# Patient Record
Sex: FEMALE | Race: WHITE | NOT HISPANIC OR LATINO | Employment: UNEMPLOYED | URBAN - NONMETROPOLITAN AREA
[De-identification: names, ages, dates, MRNs, and addresses within clinical notes are randomized per-mention and may not be internally consistent; named-entity substitution may affect disease eponyms.]

---

## 2017-11-25 ENCOUNTER — HOSPITAL ENCOUNTER (EMERGENCY)
Facility: HOSPITAL | Age: 57
Discharge: HOME | End: 2017-11-25
Attending: NURSE PRACTITIONER | Admitting: NURSE PRACTITIONER

## 2017-11-25 VITALS
HEART RATE: 70 BPM | SYSTOLIC BLOOD PRESSURE: 160 MMHG | RESPIRATION RATE: 18 BRPM | DIASTOLIC BLOOD PRESSURE: 91 MMHG | TEMPERATURE: 97.8 F | OXYGEN SATURATION: 98 %

## 2017-11-25 DIAGNOSIS — M75.52 ACUTE SHOULDER BURSITIS, LEFT: ICD-10-CM

## 2017-11-25 PROCEDURE — 99202 OFFICE O/P NEW SF 15 MIN: CPT | Performed by: NURSE PRACTITIONER

## 2017-11-25 PROCEDURE — 99213 OFFICE O/P EST LOW 20 MIN: CPT

## 2017-11-25 RX ORDER — LISINOPRIL/HYDROCHLOROTHIAZIDE 10-12.5 MG
1 TABLET ORAL DAILY
COMMUNITY
End: 2018-04-15

## 2017-11-25 RX ORDER — PREDNISONE 20 MG/1
TABLET ORAL
Qty: 10 TABLET | Refills: 0 | Status: SHIPPED | OUTPATIENT
Start: 2017-11-25 | End: 2018-04-15

## 2017-11-25 RX ORDER — CYCLOBENZAPRINE HCL 10 MG
5-10 TABLET ORAL 3 TIMES DAILY PRN
Qty: 20 TABLET | Refills: 0 | Status: SHIPPED | OUTPATIENT
Start: 2017-11-25 | End: 2018-04-15

## 2017-11-25 ASSESSMENT — ENCOUNTER SYMPTOMS
ARTHRALGIAS: 1
NECK STIFFNESS: 0
NECK PAIN: 0
CARDIOVASCULAR NEGATIVE: 1
JOINT SWELLING: 0
RESPIRATORY NEGATIVE: 1
GASTROINTESTINAL NEGATIVE: 1
CONSTITUTIONAL NEGATIVE: 1

## 2017-11-25 NOTE — ED AVS SNAPSHOT
[facility]    [address]    [place] [address]    Phone:  [phone]                                       Bibiana Stewart   MRN: [id number]    Department:  [facility]   Date of Visit:  11/25/2017           Patient Information     Date Of Birth          1960        Your diagnoses for this visit were:     Acute shoulder bursitis, left        You were seen by Qing Guerrier NP.      Follow-up Information     Please follow up.    Why:  Follow up with [facility]         Discharge Instructions         Bursitis  You have bursitis. This is an inflammation of the bursa. These are small, fluid-filled sacs that surround the larger joints of the body. The bursa help the muscles and tendons move smoothly over the joints.  Bursitis often happens in the shoulder. But it can also affect the elbows, hips, pelvis, knees, toes, and heels. Bursitis can be caused by injury, overuse of the joint, or infection of the bursa. Symptoms include pain and tenderness over a joint. Symptoms get worse with movement.  Bursitis is treated with an anti-inflammatory medicine and by resting the joint. More severe cases require injection of medicine directly into the bursa.    Home care    Rest the painful joint and protect it from movement. This will allow the inflammation to heal faster.    Apply an ice pack over the injured area for no more than 15 to 20 minutes. Do this every 3 to 6 hours for the first 24 to 48 hours. Keep using ice packs 3 to 4 times a day until the pain and swelling improves.     To make an ice pack, put ice cubes in a sealed plastic zip-lock bag. Wrap the bag in a clean, thin towel or cloth. Never put ice or an ice pack directly on the skin.      You may take over-the-counter pain medicine to treat pain and inflammation    As your symptoms improve, slowly begin to move the joint. Do not overuse the joint. This may cause the symptoms to flare up again.               Review  of your medicines      START taking        Dose / Directions Last dose taken    cyclobenzaprine 10 MG tablet   Commonly known as:  FLEXERIL   Dose:  5-10 mg   Quantity:  20 tablet        Take 0.5-1 tablets (5-10 mg) by mouth 3 times daily as needed for muscle spasms   Refills:  0        predniSONE 20 MG tablet   Commonly known as:  DELTASONE   Quantity:  10 tablet        Take two tablets (= 40mg) each day for 5 (five) days   Refills:  0          Our records show that you are taking the medicines listed below. If these are incorrect, please call your family doctor or clinic.        Dose / Directions Last dose taken    IBUPROFEN PO        Refills:  0        lisinopril-hydrochlorothiazide 10-12.5 MG per tablet   Commonly known as:  PRINZIDE/ZESTORETIC   Dose:  1 tablet        Take 1 tablet by mouth daily   Refills:  0                Prescriptions were sent or printed at these locations (2 Prescriptions)                   [id number]  [place], [place] - [address] [facility] [address]   [address] [address], [place] [address]    Telephone:  [phone]   Fax:  [phone]   Hours:                  E-Prescribed (2 of 2)         predniSONE (DELTASONE) 20 MG tablet               cyclobenzaprine (FLEXERIL) 10 MG tablet                Orders Needing Specimen Collection     None      Pending Results     No orders found from 11/23/2017 to 11/26/2017.            Pending Culture Results     No orders found from 11/23/2017 to 11/26/2017.            Thank you for choosing [facility]       Thank you for choosing [facility] for your care. Our goal is always to provide you with excellent care. Hearing back from our patients is one way we can continue to improve our services. Please take a few minutes to complete the written survey that you may receive in the mail after you visit with us. Thank you!        [facility] Information     [employer] lets you send messages to your doctor, view your test results, renew your prescriptions, schedule appointments and  "more. To sign up, go to [email or web address] . Click on \"Log in\" on the left side of the screen, which will take you to the Welcome page. Then click on \"Sign up Now\" on the right side of the page.     You will be asked to enter the access code listed below, as well as some personal information. Please follow the directions to create your username and password.     Your access code is: [email or web address]  Expires: 2018 11:26 AM     Your access code will  in 90 days. If you need help or a new code, please call your [facility] clinic or [phone].        Care EveryWhere ID     This is your Care EveryWhere ID. This could be used by other organizations to access your [facility] medical records  [id number]        Equal Access to Services     SALOMON BEATTY : Alicia Alaniz, mary moy, juan josé galan, moncho garay. So [facility] [phone].    ATENCIÓN: Si habla español, tiene a zayas disposición servicios gratuitos de asistencia lingüística. Llame al [phone].    We comply with applicable federal civil rights laws and [place] laws. We do not discriminate on the basis of race, color, national origin, age, disability, sex, sexual orientation, or gender identity.            After Visit Summary       This is your record. Keep this with you and show to your community pharmacist(s) and doctor(s) at your next visit.                  "

## 2017-11-25 NOTE — ED NOTES
C/o left shoulder pain shooting into arm and neck. Pt states she has a hx of bursitis and has had this pain for 3 weeks saw Dr. Mena at [facility] a few weeks ago. Ibuprofen not helping.

## 2017-11-25 NOTE — ED PROVIDER NOTES
History     Chief Complaint   Patient presents with     Shoulder Pain     hx bursitis in left shoulder. has been having pain in left shoulder for weeks. now unable to lift shoulder too far up.     The history is provided by the patient. No  was used.     Bibiana Stewart is a 57 year old female who presents with left shoulder bursitis for 2 weeks. Seen Dr. Mena at [facility] but he wasn't able to give her an injection at that time. Has had this in the past. Is having pain traveling into her neck. Not taking anything for pain. Nothing set up for follow up or counseling for insurance. New to the area.     Problem List:    There are no active problems to display for this patient.       Past Medical History:    History reviewed. No pertinent past medical history.    Past Surgical History:    History reviewed. No pertinent surgical history.    Family History:    No family history on file.    Social History:  Marital Status:  Single [1]  Social History   Substance Use Topics     Smoking status: Current Every Day Smoker     Smokeless tobacco: Never Used     Alcohol use Not on file        Medications:      lisinopril-hydrochlorothiazide (PRINZIDE/ZESTORETIC) 10-12.5 MG per tablet   IBUPROFEN PO   predniSONE (DELTASONE) 20 MG tablet   cyclobenzaprine (FLEXERIL) 10 MG tablet         Review of Systems   Constitutional: Negative.    HENT: Negative.    Respiratory: Negative.    Cardiovascular: Negative.    Gastrointestinal: Negative.    Musculoskeletal: Positive for arthralgias (Left shouder pain, limted ROM. ). Negative for joint swelling, neck pain and neck stiffness.   Skin: Negative.        Physical Exam   BP: 160/91  Pulse: 70  Temp: 97.8  F (36.6  C)  Resp: 18  SpO2: 98 %      Physical Exam   Constitutional: She appears well-developed and well-nourished. No distress.   Cardiovascular: Normal rate, regular rhythm and normal heart sounds.    Pulmonary/Chest: Effort normal and breath sounds normal. No  respiratory distress. She has no wheezes.   Musculoskeletal:        Left shoulder: She exhibits decreased range of motion and tenderness. She exhibits no swelling, no effusion, no crepitus, no deformity and no laceration.        Left elbow: Normal.        Left wrist: Normal.        Left hand: Normal.   Pain in left shoulder and trapezius with light touch. Any movement causes the patient to wince in pain. No swelling, no erythema, no ecchymosis, no deformity.    Skin: She is not diaphoretic.   Vitals reviewed.      ED Course     ED Course     Procedures    Labs Ordered and Resulted from Time of ED Arrival Up to the Time of Departure from the ED - No data to display    Assessments & Plan (with Medical Decision Making)     I have reviewed the nursing notes.  I have reviewed the findings, diagnosis, plan and need for follow up with the patient.  Rest and take medications for pain as ordered.   Advised f/u with [facility] for assistance with shoulder injection if Dr. Mena believes this is warranted.   Should also request assistance with insurance and set up primary care.     Discharge Medication List as of 11/25/2017 11:27 AM      START taking these medications    Details   predniSONE (DELTASONE) 20 MG tablet Take two tablets (= 40mg) each day for 5 (five) days, Disp-10 tablet, R-0, E-Prescribe      cyclobenzaprine (FLEXERIL) 10 MG tablet Take 0.5-1 tablets (5-10 mg) by mouth 3 times daily as needed for muscle spasms, Disp-20 tablet, R-0, E-Prescribe             Final diagnoses:   Acute shoulder bursitis, left       11/25/2017   [facility]     Qing Guerrier NP  11/25/17 8406

## 2017-11-25 NOTE — ED AVS SNAPSHOT
[facility]    [address] [address]    Phone:  [phone]                                       Bibiana Stewart   MRN: [id number]    Department:  [facility]   Date of Visit:  11/25/2017           After Visit Summary Signature Page     I have received my discharge instructions, and my questions have been answered. I have discussed any challenges I see with this plan with the nurse or doctor.    ..........................................................................................................................................  Patient/Patient Representative Signature      ..........................................................................................................................................  Patient Representative Print Name and Relationship to Patient    ..................................................               ................................................  Date                                            Time    ..........................................................................................................................................  Reviewed by Signature/Title    ...................................................              ..............................................  Date                                                            Time

## 2018-04-15 ENCOUNTER — APPOINTMENT (OUTPATIENT)
Dept: CT IMAGING | Facility: HOSPITAL | Age: 58
End: 2018-04-15
Attending: EMERGENCY MEDICINE
Payer: MEDICAID

## 2018-04-15 ENCOUNTER — HOSPITAL ENCOUNTER (EMERGENCY)
Facility: HOSPITAL | Age: 58
Discharge: ANOTHER HOSPITAL | End: 2018-04-16
Attending: EMERGENCY MEDICINE | Admitting: EMERGENCY MEDICINE
Payer: MEDICAID

## 2018-04-15 ENCOUNTER — APPOINTMENT (OUTPATIENT)
Dept: GENERAL RADIOLOGY | Facility: HOSPITAL | Age: 58
End: 2018-04-15
Attending: EMERGENCY MEDICINE
Payer: MEDICAID

## 2018-04-15 DIAGNOSIS — G45.9 TRANSIENT CEREBRAL ISCHEMIA, UNSPECIFIED TYPE: Primary | ICD-10-CM

## 2018-04-15 DIAGNOSIS — Z72.0 TOBACCO ABUSE: ICD-10-CM

## 2018-04-15 DIAGNOSIS — R47.81 SLURRED SPEECH: ICD-10-CM

## 2018-04-15 DIAGNOSIS — I10 BENIGN ESSENTIAL HYPERTENSION: ICD-10-CM

## 2018-04-15 LAB
ALBUMIN SERPL-MCNC: 3.2 G/DL (ref 3.4–5)
ALP SERPL-CCNC: 94 U/L (ref 40–150)
ALT SERPL W P-5'-P-CCNC: 23 U/L (ref 0–50)
ANION GAP SERPL CALCULATED.3IONS-SCNC: 8 MMOL/L (ref 3–14)
APTT PPP: 26 SEC (ref 24–37)
AST SERPL W P-5'-P-CCNC: 14 U/L (ref 0–45)
BASOPHILS # BLD AUTO: 0 10E9/L (ref 0–0.2)
BASOPHILS NFR BLD AUTO: 0.5 %
BILIRUB SERPL-MCNC: 0.5 MG/DL (ref 0.2–1.3)
BUN SERPL-MCNC: 20 MG/DL (ref 7–30)
CALCIUM SERPL-MCNC: 8.8 MG/DL (ref 8.5–10.1)
CHLORIDE SERPL-SCNC: 107 MMOL/L (ref 94–109)
CK SERPL-CCNC: 38 U/L (ref 30–225)
CO2 SERPL-SCNC: 26 MMOL/L (ref 20–32)
CREAT SERPL-MCNC: 0.79 MG/DL (ref 0.52–1.04)
CRP SERPL-MCNC: <2.9 MG/L (ref 0–8)
D DIMER PPP DDU-MCNC: <200 NG/ML D-DU (ref 0–300)
DIFFERENTIAL METHOD BLD: NORMAL
EOSINOPHIL # BLD AUTO: 0.2 10E9/L (ref 0–0.7)
EOSINOPHIL NFR BLD AUTO: 2 %
ERYTHROCYTE [DISTWIDTH] IN BLOOD BY AUTOMATED COUNT: 12 % (ref 10–15)
ERYTHROCYTE [SEDIMENTATION RATE] IN BLOOD BY WESTERGREN METHOD: 9 MM/H (ref 0–30)
GFR SERPL CREATININE-BSD FRML MDRD: 75 ML/MIN/1.7M2
GLUCOSE BLDC GLUCOMTR-MCNC: 108 MG/DL (ref 70–99)
GLUCOSE SERPL-MCNC: 111 MG/DL (ref 70–99)
HCT VFR BLD AUTO: 42.6 % (ref 35–47)
HGB BLD-MCNC: 14.9 G/DL (ref 11.7–15.7)
IMM GRANULOCYTES # BLD: 0 10E9/L (ref 0–0.4)
IMM GRANULOCYTES NFR BLD: 0.3 %
INR PPP: 1.03 (ref 0.8–1.2)
LACTATE SERPL-SCNC: 1.1 MMOL/L (ref 0.4–2)
LYMPHOCYTES # BLD AUTO: 2.4 10E9/L (ref 0.8–5.3)
LYMPHOCYTES NFR BLD AUTO: 27 %
MCH RBC QN AUTO: 30.5 PG (ref 26.5–33)
MCHC RBC AUTO-ENTMCNC: 35 G/DL (ref 31.5–36.5)
MCV RBC AUTO: 87 FL (ref 78–100)
MONOCYTES # BLD AUTO: 0.6 10E9/L (ref 0–1.3)
MONOCYTES NFR BLD AUTO: 6.6 %
NEUTROPHILS # BLD AUTO: 5.6 10E9/L (ref 1.6–8.3)
NEUTROPHILS NFR BLD AUTO: 63.6 %
NRBC # BLD AUTO: 0 10*3/UL
NRBC BLD AUTO-RTO: 0 /100
PLATELET # BLD AUTO: 318 10E9/L (ref 150–450)
POTASSIUM SERPL-SCNC: 3.7 MMOL/L (ref 3.4–5.3)
PROT SERPL-MCNC: 6.8 G/DL (ref 6.8–8.8)
RBC # BLD AUTO: 4.88 10E12/L (ref 3.8–5.2)
SODIUM SERPL-SCNC: 141 MMOL/L (ref 133–144)
TROPONIN I SERPL-MCNC: <0.015 UG/L (ref 0–0.04)
TSH SERPL DL<=0.005 MIU/L-ACNC: 1.21 MU/L (ref 0.4–4)
WBC # BLD AUTO: 8.8 10E9/L (ref 4–11)

## 2018-04-15 PROCEDURE — 71045 X-RAY EXAM CHEST 1 VIEW: CPT | Mod: TC

## 2018-04-15 PROCEDURE — 85730 THROMBOPLASTIN TIME PARTIAL: CPT | Performed by: EMERGENCY MEDICINE

## 2018-04-15 PROCEDURE — 93010 ELECTROCARDIOGRAM REPORT: CPT | Performed by: INTERNAL MEDICINE

## 2018-04-15 PROCEDURE — 00000146 ZZHCL STATISTIC GLUCOSE BY METER IP

## 2018-04-15 PROCEDURE — 99285 EMERGENCY DEPT VISIT HI MDM: CPT | Mod: Z6 | Performed by: EMERGENCY MEDICINE

## 2018-04-15 PROCEDURE — 99285 EMERGENCY DEPT VISIT HI MDM: CPT | Mod: 25

## 2018-04-15 PROCEDURE — 85610 PROTHROMBIN TIME: CPT | Performed by: EMERGENCY MEDICINE

## 2018-04-15 PROCEDURE — 83605 ASSAY OF LACTIC ACID: CPT | Performed by: EMERGENCY MEDICINE

## 2018-04-15 PROCEDURE — 80053 COMPREHEN METABOLIC PANEL: CPT | Performed by: EMERGENCY MEDICINE

## 2018-04-15 PROCEDURE — 86140 C-REACTIVE PROTEIN: CPT | Performed by: EMERGENCY MEDICINE

## 2018-04-15 PROCEDURE — 25000128 H RX IP 250 OP 636: Performed by: EMERGENCY MEDICINE

## 2018-04-15 PROCEDURE — 85652 RBC SED RATE AUTOMATED: CPT | Performed by: EMERGENCY MEDICINE

## 2018-04-15 PROCEDURE — 85025 COMPLETE CBC W/AUTO DIFF WBC: CPT | Performed by: EMERGENCY MEDICINE

## 2018-04-15 PROCEDURE — 82550 ASSAY OF CK (CPK): CPT | Performed by: EMERGENCY MEDICINE

## 2018-04-15 PROCEDURE — 84484 ASSAY OF TROPONIN QUANT: CPT | Performed by: EMERGENCY MEDICINE

## 2018-04-15 PROCEDURE — 93005 ELECTROCARDIOGRAM TRACING: CPT

## 2018-04-15 PROCEDURE — 70498 CT ANGIOGRAPHY NECK: CPT | Mod: TC

## 2018-04-15 PROCEDURE — 84443 ASSAY THYROID STIM HORMONE: CPT | Performed by: EMERGENCY MEDICINE

## 2018-04-15 PROCEDURE — 85379 FIBRIN DEGRADATION QUANT: CPT | Performed by: EMERGENCY MEDICINE

## 2018-04-15 PROCEDURE — 70450 CT HEAD/BRAIN W/O DYE: CPT | Mod: TC

## 2018-04-15 PROCEDURE — 36415 COLL VENOUS BLD VENIPUNCTURE: CPT | Performed by: EMERGENCY MEDICINE

## 2018-04-15 RX ORDER — LISINOPRIL AND HYDROCHLOROTHIAZIDE 20; 25 MG/1; MG/1
1 TABLET ORAL DAILY
COMMUNITY
End: 2023-01-09

## 2018-04-15 RX ORDER — KETOROLAC TROMETHAMINE 30 MG/ML
30 INJECTION, SOLUTION INTRAMUSCULAR; INTRAVENOUS ONCE
Status: COMPLETED | OUTPATIENT
Start: 2018-04-15 | End: 2018-04-16

## 2018-04-15 RX ORDER — IOPAMIDOL 755 MG/ML
75 INJECTION, SOLUTION INTRAVASCULAR ONCE
Status: COMPLETED | OUTPATIENT
Start: 2018-04-15 | End: 2018-04-15

## 2018-04-15 RX ADMIN — IOPAMIDOL 75 ML: 755 INJECTION, SOLUTION INTRAVENOUS at 23:13

## 2018-04-15 RX ADMIN — SODIUM CHLORIDE 500 ML: 9 INJECTION, SOLUTION INTRAVENOUS at 22:47

## 2018-04-16 ENCOUNTER — TRANSFERRED RECORDS (OUTPATIENT)
Dept: HEALTH INFORMATION MANAGEMENT | Facility: CLINIC | Age: 58
End: 2018-04-16

## 2018-04-16 VITALS
HEART RATE: 71 BPM | TEMPERATURE: 97.8 F | DIASTOLIC BLOOD PRESSURE: 92 MMHG | SYSTOLIC BLOOD PRESSURE: 160 MMHG | RESPIRATION RATE: 18 BRPM | OXYGEN SATURATION: 98 %

## 2018-04-16 PROCEDURE — 25000128 H RX IP 250 OP 636: Performed by: EMERGENCY MEDICINE

## 2018-04-16 PROCEDURE — 25000132 ZZH RX MED GY IP 250 OP 250 PS 637: Performed by: EMERGENCY MEDICINE

## 2018-04-16 PROCEDURE — 96374 THER/PROPH/DIAG INJ IV PUSH: CPT | Mod: XU

## 2018-04-16 RX ORDER — ACETAMINOPHEN 325 MG/1
975 TABLET ORAL ONCE
Status: COMPLETED | OUTPATIENT
Start: 2018-04-16 | End: 2018-04-16

## 2018-04-16 RX ORDER — ASPIRIN 81 MG/1
324 TABLET, CHEWABLE ORAL ONCE
Status: COMPLETED | OUTPATIENT
Start: 2018-04-16 | End: 2018-04-16

## 2018-04-16 RX ADMIN — ACETAMINOPHEN 975 MG: 325 TABLET, FILM COATED ORAL at 06:54

## 2018-04-16 RX ADMIN — ASPIRIN 81 MG 324 MG: 81 TABLET ORAL at 02:09

## 2018-04-16 RX ADMIN — KETOROLAC TROMETHAMINE 30 MG: 30 INJECTION, SOLUTION INTRAMUSCULAR at 00:11

## 2018-04-16 NOTE — DISCHARGE INSTRUCTIONS
What to expect when you have contrast    During your exam, we will inject  contrast  into your vein or artery. (Contrast is a clear liquid with iodine in it. It shows up on X-rays.)    You may feel warm or hot. You may have a metal taste in your mouth and a slight upset stomach. You may also feel pressure near the kidneys and bladder. These effects will last about 1 to 3 minutes.    Please tell us if you have:    Sneezing     Itching    Hives     Swelling in the face    A hoarse voice    Breathing problems    Other new symptoms    Serious problems are rare.  They may include:    Irregular heartbeat     Seizures    Kidney failure              Tissue damage    Shock      Death    If you have any problems during the exam, we  will treat them right away.    When you get home    Call your hospital if you have any new symptoms in the next 2 days, like hives or swelling. (Phone numbers are at the bottom of this page.) Or call your family doctor.     If you have wheezing or trouble breathing, call 911.    Self-care  -Drink at least 4 extra glasses of water today.   This reduces the stress on your kidneys.  -Keep taking your regular medicines.    The contrast will pass out of your body in your  Urine(pee). This will happen in the next 24 hours. You  will not feel this. Your urine will not  change color.    If you have kidney problems or take metformin    Drink 4 to 8 large glasses of water for the next  2 days, if you are not on a fluid restriction.    ?If you take metformin (Glucophage or Glucovance) for diabetes, keep taking it.      ?Your kidney function tests are abnormal.  If you take Metformin, do not take it for 48 hours. Please go to your clinic for a blood test within 3 days after your exam before the restarting this medicine.     (Note to provider:please give patient prescription for lab tests.)    ?Special instructions: Drink an extra 4 glasses of water to flush out the contrast.     I have read and understand the  above information.    Patient Sign Here:______________________________________Date:________Time:______    Staff Sign Here:________________________________________Date:_______Time:______      Radiology Departments:     ?[facility]: [phone] ?[facility]: [phone]     ?[facility]: [phone] ?[facility]:[phone]      ?[facility]: [phone]  ?[facility]: [phone]  ?[facility]:[phone]    ?[facility]:[phone]  ?[facility]:[phone]

## 2018-04-16 NOTE — ED NOTES
"In ED for \"having slurred speech on and off today since this morning and for being dizzy.\"  Currently speech is slurred.   "

## 2018-04-16 NOTE — ED NOTES
Pt is transferred to [facility] at this time via [facility] after report given to medic.  Report to [facility] by Janine ANDRADE RN.  VSS.  States slight HA and does not rate.  IV site is WDL.  Pt up to the BR to void.  Transferred at this time to [facility] via [facility].  Called [facility] and updated - pt leaving at this time.

## 2018-04-16 NOTE — ED PROVIDER NOTES
"  History     Chief Complaint   Patient presents with     Slurred Speech     \"started this morning\"      Dizziness     HPI  Bibiana Stewart is a 57 year old female who presents to the emergency department accompanied by the daughter.  Most of the history was obtained from the daughter.  Daughter states that her mom has had slurring of speech on and off since morning.  Started in the morning and resolved after some time.  She again developed slurring of speech later this evening and then decided to bring her to the emergency department.  Denies any unilateral body weakness, seizures, head trauma, neck pain or stiffness.  This morning her speech has persisted since then.  Patient is also feeling dizzy but denies shortness of breath or palpitations.  Patient is feeling weak and dizzy but denies any facial asymmetry.  She is current everyday smoker and she takes lisinopril with hydrochlorothiazide for hypertension.    Problem List:    There are no active problems to display for this patient.       Past Medical History:    History reviewed. No pertinent past medical history.    Past Surgical History:    History reviewed. No pertinent surgical history.  And takes lisinopril with hydrochlorothiazide for hypertension.  Family History:    No family history on file.    Social History:  Marital Status:  Single [1]  Social History   Substance Use Topics     Smoking status: Current Every Day Smoker     Packs/day: 0.50     Smokeless tobacco: Never Used      Comment: trying to quit, has cut down     Alcohol use Yes      Comment: rarely        Medications:      lisinopril-hydrochlorothiazide (PRINZIDE/ZESTORETIC) 20-25 MG per tablet   IBUPROFEN PO         Review of Systems   All other systems reviewed and are negative.      Physical Exam   BP: (!) 176/105  Pulse: 82  Heart Rate: 82  Temp: 97.9  F (36.6  C)  Resp: 16  SpO2: 96 %      Physical Exam   Constitutional: She is oriented to person, place, and time. She appears " well-developed and well-nourished. No distress.   Slurred speech   HENT:   Head: Normocephalic and atraumatic.   Mouth/Throat: Oropharynx is clear and moist.   Eyes: Pupils are equal, round, and reactive to light.   Neck: Neck supple.   Cardiovascular: Normal rate, regular rhythm and normal heart sounds.    Pulmonary/Chest: Breath sounds normal. No respiratory distress. She has no wheezes.   Abdominal: Soft. Bowel sounds are normal. There is no tenderness.   Musculoskeletal: She exhibits no edema or tenderness.   Neurological: She is alert and oriented to person, place, and time. No cranial nerve deficit.   Skin: She is not diaphoretic.   Nursing note and vitals reviewed.      ED Course   Slurring of speech resolved spontaneously within 1 hour of being in the emergency department.  Patient otherwise remained stable in the ED.  Received 1 dose of IV Toradol for headache and 1 dose of oral Tylenol for the same.  Vitals remained stable throughout ED stay.    ED Course     Procedures               EKG Interpretation:      Interpreted by Dell Galvez  Time reviewed:   Symptoms at time of EKG: slurred speech   Rhythm: normal sinus   Rate: normal  Axis: normal  Ectopy: none  Conduction: normal  ST Segments/ T Waves: No ST-T wave changes  Q Waves: none  Comparison to prior: No old EKG available    Clinical Impression: normal EKG              Results for orders placed or performed during the hospital encounter of 04/15/18 (from the past 24 hour(s))   Glucose by meter   Result Value Ref [facility]    Glucose 108 (H) 70 - 99 mg/dL   CBC with platelets differential   Result Value Ref [facility]    WBC 8.8 4.0 - 11.0 10e9/L    RBC Count 4.88 3.8 - 5.2 10e12/L    Hemoglobin 14.9 11.7 - 15.7 g/dL    Hematocrit 42.6 35.0 - 47.0 %    MCV 87 78 - 100 fl    MCH 30.5 26.5 - 33.0 pg    MCHC 35.0 31.5 - 36.5 g/dL    RDW 12.0 10.0 - 15.0 %    Platelet Count 318 150 - 450 10e9/L    Diff Method Automated Method     % Neutrophils 63.6 %    %  Lymphocytes 27.0 %    % Monocytes 6.6 %    % Eosinophils 2.0 %    % Basophils 0.5 %    % Immature Granulocytes 0.3 %    Nucleated RBCs 0 0 /100    Absolute Neutrophil 5.6 1.6 - 8.3 10e9/L    Absolute Lymphocytes 2.4 0.8 - 5.3 10e9/L    Absolute Monocytes 0.6 0.0 - 1.3 10e9/L    Absolute Eosinophils 0.2 0.0 - 0.7 10e9/L    Absolute Basophils 0.0 0.0 - 0.2 10e9/L    Abs Immature Granulocytes 0.0 0 - 0.4 10e9/L    Absolute Nucleated RBC 0.0    CK total   Result Value Ref [facility]    CK Total 38 30 - 225 U/L   Comprehensive metabolic panel   Result Value Ref [place]    Sodium 141 133 - 144 mmol/L    Potassium 3.7 3.4 - 5.3 mmol/L    Chloride 107 94 - 109 mmol/L    Carbon Dioxide 26 20 - 32 mmol/L    Anion Gap 8 3 - 14 mmol/L    Glucose 111 (H) 70 - 99 mg/dL    Urea Nitrogen 20 7 - 30 mg/dL    Creatinine 0.79 0.52 - 1.04 mg/dL    GFR Estimate 75 >60 mL/min/1.7m2    GFR Estimate If Black >90 >60 mL/min/1.7m2    Calcium 8.8 8.5 - 10.1 mg/dL    Bilirubin Total 0.5 0.2 - 1.3 mg/dL    Albumin 3.2 (L) 3.4 - 5.0 g/dL    Protein Total 6.8 6.8 - 8.8 g/dL    Alkaline Phosphatase 94 40 - 150 U/L    ALT 23 0 - 50 U/L    AST 14 0 - 45 U/L   CRP inflammation   Result Value Ref [facility]    CRP Inflammation <2.9 0.0 - 8.0 mg/L   D-Dimer ([facility])   Result Value Ref [facility]    D-Dimer ng/mL <200 0 - 300 ng/ml D-DU   Erythrocyte sedimentation rate auto   Result Value Ref [facility]    Sed Rate 9 0 - 30 mm/h   INR   Result Value Ref [facility]    INR 1.03 0.80 - 1.20   Lactic acid   Result Value Ref [facility]    Lactic Acid 1.1 0.4 - 2.0 mmol/L   Partial thromboplastin time   Result Value Ref [facility]    PTT 26 24.00 - 37.00 sec   Troponin I   Result Value Ref [facility]    Troponin I ES <0.015 0.000 - 0.045 ug/L   TSH with free T4 reflex   Result Value Ref [facility]    TSH 1.21 0.40 - 4.00 mU/L       Medications   0.9% sodium chloride BOLUS (0 mLs Intravenous Stopped 4/15/18 2032)   iopamidol (ISOVUE-370) solution 75 mL (75 mLs Intravenous Given 4/15/18 5823)   sodium  chloride (PF) 0.9% PF flush 100 mL (250 mLs Intravenous Given 4/15/18 9594)   ketorolac (TORADOL) injection 30 mg (30 mg Intravenous Given 4/16/18 0011)   aspirin chewable tablet 324 mg (324 mg Oral Given 4/16/18 0209)   acetaminophen (TYLENOL) tablet 975 mg (975 mg Oral Given 4/16/18 9028)       Assessments & Plan (with Medical Decision Making)   Transient ischemic attack: Patient evaluated in the emergency department with normal CT scan of the brain apart from an old infarct in the basal ganglia.  Laboratory results reviewed showed normal CBC, CMP, lactic acid, PTT, TSH, troponin, INR, CRP.  CT angiogram showed plaque in the right carotid bifurcation.  EKG showed normal sinus rhythm without any acute changes.  While patient was in the emergency department slurring of speech resolved spontaneously within 1 hour being in the ED.  Since this is a second episode of slurring of speech within the last 24 hours, Replaced by [facility] in [place] was consulted and Dr. Magallon accepted patient to be transferred to his care to be reviewed by a neurologist.  Patient transferred to [facility] in [place] by ALS.    I have reviewed the nursing notes.    I have reviewed the findings, diagnosis, plan and need for follow up with the patient.     National [employer] Stroke Scale  Exam Interval: Baseline   Score    Level of consciousness: (1)   Not alert; arousable w/ minor stim to obey/answer/respond    LOC questions: (0)   Answers both questions correctly    LOC commands: (0)   Performs both tasks correctly    Best gaze: (0)   Normal    Visual: (0)   No visual loss    Facial palsy: (0)   Normal symmetrical movements    Motor arm (left): (0)   No drift    Motor arm (right): (0)   No drift    Motor leg (left): (0)   No drift    Motor leg (right): (0)   No drift    Limb ataxia: (0)   Absent    Sensory: (0)   Normal- no sensory loss    Best language: (0)   Normal- no aphasia    Dysarthria: (1)   Mild to moderate  dysarthria    Extinction and inattention: (0)   No abnormality        Total Score:  2       Discharge Medication List as of 4/16/2018  7:43 AM          Final diagnoses:   Slurred speech   Transient cerebral ischemia, unspecified type   Benign essential hypertension   Tobacco abuse       4/15/2018   [facility]     Dell Galvez MD  04/16/18 0748

## 2018-08-07 ENCOUNTER — OFFICE VISIT (OUTPATIENT)
Dept: PSYCHOLOGY | Facility: OTHER | Age: 58
End: 2018-08-07
Attending: COUNSELOR
Payer: COMMERCIAL

## 2018-08-07 DIAGNOSIS — F43.23 ADJUSTMENT DISORDER WITH MIXED ANXIETY AND DEPRESSED MOOD: Primary | ICD-10-CM

## 2018-08-07 PROCEDURE — 90791 PSYCH DIAGNOSTIC EVALUATION: CPT | Performed by: COUNSELOR

## 2018-08-07 ASSESSMENT — ANXIETY QUESTIONNAIRES
5. BEING SO RESTLESS THAT IT IS HARD TO SIT STILL: NOT AT ALL
7. FEELING AFRAID AS IF SOMETHING AWFUL MIGHT HAPPEN: MORE THAN HALF THE DAYS
2. NOT BEING ABLE TO STOP OR CONTROL WORRYING: NEARLY EVERY DAY
6. BECOMING EASILY ANNOYED OR IRRITABLE: NEARLY EVERY DAY
GAD7 TOTAL SCORE: 17
1. FEELING NERVOUS, ANXIOUS, OR ON EDGE: NEARLY EVERY DAY
3. WORRYING TOO MUCH ABOUT DIFFERENT THINGS: NEARLY EVERY DAY
4. TROUBLE RELAXING: NEARLY EVERY DAY

## 2018-08-07 NOTE — MR AVS SNAPSHOT
After Visit Summary   8/7/2018    Bibiana Stewart    MRN: [id number]           Patient Information     Date Of Birth          1960        Visit Information        Provider Department      8/7/2018 10:30 AM Tiffanie Guadarrama [facility]        Today's Diagnoses     Adjustment disorder with mixed anxiety and depressed mood    -  1       Follow-ups after your visit        Your next 10 appointments already scheduled     Aug 21, 2018 11:00 AM CDT   (Arrive by 10:45 AM)   [facility]    [address] [place] [id number] [phone]              Who to contact     If you have questions or need follow up information about today's clinic visit or your schedule please contact [facility] directly at [phone].  Normal or non-critical lab and imaging results will be communicated to you by MyChart, letter or phone within 4 business days after the clinic has received the results. If you do not hear from us within 7 days, please contact the clinic through MyChart or phone. If you have a critical or abnormal lab result, we will notify you by phone as soon as possible.  Submit refill requests through [facility] or call your pharmacy and they will forward the refill request to us. Please allow 3 business days for your refill to be completed.          Additional Information About Your Visit        Care EveryWhere ID     This is your Care EveryWhere ID. This could be used by other organizations to access your [facility] medical records  [id number]         Blood Pressure from Last 3 Encounters:   04/16/18 160/92   11/25/17 160/91    Weight from Last 3 Encounters:   No data found for Wt              Today, you had the following     No orders found for display       Primary Care Provider Office Phone # Fax #    Digna Almaraz [phone]       [facility] [address] [facility] [address]        Equal Access to Services     ALEKSANDREDWARD JACI : Hadii aad ku hadángelabecky Alaniz, watremayneda farzaneh, qavinitata everettmadamon galan, moncho watsonolenasofia garay. So [facility] [phone].    ATENCIÓN: Si habla español, tiene a zayas disposición servicios gratuitos de asistencia lingüística. Llame al [phone].    We comply with applicable federal civil rights laws and [place] laws. We do not discriminate on the basis of race, color, national origin, age, disability, sex, sexual orientation, or gender identity.            Thank you!     Thank you for choosing [facility]  for your care. Our goal is always to provide you with excellent care. Hearing back from our patients is one way we can continue to improve our services. Please take a few minutes to complete the written survey that you may receive in the mail after your visit with us. Thank you!             Your Updated Medication List - Protect others around you: Learn how to safely use, store and throw away your medicines at www.disposemymeds.org.          This list is accurate as of 8/7/18 11:19 AM.  Always use your most recent med list.                   Brand Name Dispense Instructions for use Diagnosis    IBUPROFEN PO      Take 800 mg by mouth daily        lisinopril-hydrochlorothiazide 20-25 MG per tablet    PRINZIDE/ZESTORETIC     Take 1 tablet by mouth daily
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
## 2018-08-08 ASSESSMENT — PATIENT HEALTH QUESTIONNAIRE - PHQ9: SUM OF ALL RESPONSES TO PHQ QUESTIONS 1-9: 19

## 2018-08-08 ASSESSMENT — ANXIETY QUESTIONNAIRES: GAD7 TOTAL SCORE: 17

## 2018-08-21 ENCOUNTER — DOCUMENTATION ONLY (OUTPATIENT)
Dept: PSYCHOLOGY | Facility: OTHER | Age: 58
End: 2018-08-21

## 2018-08-21 DIAGNOSIS — Z53.9 NO SHOW: Primary | ICD-10-CM

## 2019-01-11 ENCOUNTER — HOSPITAL ENCOUNTER (EMERGENCY)
Facility: HOSPITAL | Age: 59
Discharge: HOME | End: 2019-01-11
Attending: INTERNAL MEDICINE | Admitting: INTERNAL MEDICINE
Payer: COMMERCIAL

## 2019-01-11 VITALS
SYSTOLIC BLOOD PRESSURE: 170 MMHG | DIASTOLIC BLOOD PRESSURE: 89 MMHG | TEMPERATURE: 97.5 F | RESPIRATION RATE: 18 BRPM | WEIGHT: 120 LBS | OXYGEN SATURATION: 99 % | HEART RATE: 102 BPM

## 2019-01-11 DIAGNOSIS — N10 PYELONEPHRITIS, ACUTE: ICD-10-CM

## 2019-01-11 LAB
ALBUMIN SERPL-MCNC: 3.2 G/DL (ref 3.4–5)
ALBUMIN UR-MCNC: 30 MG/DL
ALP SERPL-CCNC: 86 U/L (ref 40–150)
ALT SERPL W P-5'-P-CCNC: 40 U/L (ref 0–50)
ANION GAP SERPL CALCULATED.3IONS-SCNC: 8 MMOL/L (ref 3–14)
APPEARANCE UR: ABNORMAL
AST SERPL W P-5'-P-CCNC: 22 U/L (ref 0–45)
BACTERIA #/AREA URNS HPF: ABNORMAL /HPF
BASOPHILS # BLD AUTO: 0 10E9/L (ref 0–0.2)
BASOPHILS NFR BLD AUTO: 0.1 %
BILIRUB SERPL-MCNC: 1 MG/DL (ref 0.2–1.3)
BILIRUB UR QL STRIP: NEGATIVE
BUN SERPL-MCNC: 13 MG/DL (ref 7–30)
CALCIUM SERPL-MCNC: 8.3 MG/DL (ref 8.5–10.1)
CHLORIDE SERPL-SCNC: 104 MMOL/L (ref 94–109)
CO2 SERPL-SCNC: 24 MMOL/L (ref 20–32)
COLOR UR AUTO: YELLOW
CREAT SERPL-MCNC: 0.7 MG/DL (ref 0.52–1.04)
DIFFERENTIAL METHOD BLD: ABNORMAL
EOSINOPHIL # BLD AUTO: 0 10E9/L (ref 0–0.7)
EOSINOPHIL NFR BLD AUTO: 0 %
ERYTHROCYTE [DISTWIDTH] IN BLOOD BY AUTOMATED COUNT: 12.4 % (ref 10–15)
GFR SERPL CREATININE-BSD FRML MDRD: >90 ML/MIN/{1.73_M2}
GLUCOSE SERPL-MCNC: 132 MG/DL (ref 70–99)
GLUCOSE UR STRIP-MCNC: NEGATIVE MG/DL
HCT VFR BLD AUTO: 45.7 % (ref 35–47)
HGB BLD-MCNC: 16 G/DL (ref 11.7–15.7)
HGB UR QL STRIP: ABNORMAL
HYALINE CASTS #/AREA URNS LPF: 4 /LPF
IMM GRANULOCYTES # BLD: 0 10E9/L (ref 0–0.4)
IMM GRANULOCYTES NFR BLD: 0.4 %
KETONES UR STRIP-MCNC: NEGATIVE MG/DL
LACTATE BLD-SCNC: 1.7 MMOL/L (ref 0.7–2)
LEUKOCYTE ESTERASE UR QL STRIP: ABNORMAL
LYMPHOCYTES # BLD AUTO: 0.6 10E9/L (ref 0.8–5.3)
LYMPHOCYTES NFR BLD AUTO: 8.9 %
MCH RBC QN AUTO: 30 PG (ref 26.5–33)
MCHC RBC AUTO-ENTMCNC: 35 G/DL (ref 31.5–36.5)
MCV RBC AUTO: 86 FL (ref 78–100)
MONOCYTES # BLD AUTO: 0.4 10E9/L (ref 0–1.3)
MONOCYTES NFR BLD AUTO: 5.5 %
MUCOUS THREADS #/AREA URNS LPF: PRESENT /LPF
NEUTROPHILS # BLD AUTO: 5.8 10E9/L (ref 1.6–8.3)
NEUTROPHILS NFR BLD AUTO: 85.1 %
NITRATE UR QL: POSITIVE
NRBC # BLD AUTO: 0 10*3/UL
NRBC BLD AUTO-RTO: 0 /100
PH UR STRIP: 5.5 PH (ref 4.7–8)
PLATELET # BLD AUTO: 236 10E9/L (ref 150–450)
POTASSIUM SERPL-SCNC: 3.4 MMOL/L (ref 3.4–5.3)
PROT SERPL-MCNC: 6.4 G/DL (ref 6.8–8.8)
RBC # BLD AUTO: 5.34 10E12/L (ref 3.8–5.2)
RBC #/AREA URNS AUTO: 1 /HPF (ref 0–2)
SODIUM SERPL-SCNC: 136 MMOL/L (ref 133–144)
SOURCE: ABNORMAL
SP GR UR STRIP: 1.03 (ref 1–1.03)
SQUAMOUS #/AREA URNS AUTO: 32 /HPF (ref 0–1)
TROPONIN I SERPL-MCNC: <0.015 UG/L (ref 0–0.04)
UROBILINOGEN UR STRIP-MCNC: 2 MG/DL (ref 0–2)
WBC # BLD AUTO: 6.8 10E9/L (ref 4–11)
WBC #/AREA URNS AUTO: 24 /HPF (ref 0–5)
WBC CLUMPS #/AREA URNS HPF: PRESENT /HPF

## 2019-01-11 PROCEDURE — 87186 SC STD MICRODIL/AGAR DIL: CPT | Performed by: INTERNAL MEDICINE

## 2019-01-11 PROCEDURE — 93010 ELECTROCARDIOGRAM REPORT: CPT | Performed by: INTERNAL MEDICINE

## 2019-01-11 PROCEDURE — 25000125 ZZHC RX 250: Performed by: FAMILY MEDICINE

## 2019-01-11 PROCEDURE — 99284 EMERGENCY DEPT VISIT MOD MDM: CPT | Mod: Z6 | Performed by: FAMILY MEDICINE

## 2019-01-11 PROCEDURE — 80053 COMPREHEN METABOLIC PANEL: CPT | Performed by: INTERNAL MEDICINE

## 2019-01-11 PROCEDURE — 87088 URINE BACTERIA CULTURE: CPT | Performed by: INTERNAL MEDICINE

## 2019-01-11 PROCEDURE — 96375 TX/PRO/DX INJ NEW DRUG ADDON: CPT

## 2019-01-11 PROCEDURE — 25000132 ZZH RX MED GY IP 250 OP 250 PS 637: Performed by: INTERNAL MEDICINE

## 2019-01-11 PROCEDURE — 85025 COMPLETE CBC W/AUTO DIFF WBC: CPT | Performed by: INTERNAL MEDICINE

## 2019-01-11 PROCEDURE — 87040 BLOOD CULTURE FOR BACTERIA: CPT | Performed by: FAMILY MEDICINE

## 2019-01-11 PROCEDURE — 25000132 ZZH RX MED GY IP 250 OP 250 PS 637: Performed by: FAMILY MEDICINE

## 2019-01-11 PROCEDURE — 36415 COLL VENOUS BLD VENIPUNCTURE: CPT | Performed by: INTERNAL MEDICINE

## 2019-01-11 PROCEDURE — 99284 EMERGENCY DEPT VISIT MOD MDM: CPT | Mod: 25

## 2019-01-11 PROCEDURE — 25000131 ZZH RX MED GY IP 250 OP 636 PS 637: Performed by: INTERNAL MEDICINE

## 2019-01-11 PROCEDURE — 25000128 H RX IP 250 OP 636: Performed by: INTERNAL MEDICINE

## 2019-01-11 PROCEDURE — 93005 ELECTROCARDIOGRAM TRACING: CPT

## 2019-01-11 PROCEDURE — 83605 ASSAY OF LACTIC ACID: CPT | Performed by: INTERNAL MEDICINE

## 2019-01-11 PROCEDURE — 96365 THER/PROPH/DIAG IV INF INIT: CPT

## 2019-01-11 PROCEDURE — 87086 URINE CULTURE/COLONY COUNT: CPT | Performed by: INTERNAL MEDICINE

## 2019-01-11 PROCEDURE — 84484 ASSAY OF TROPONIN QUANT: CPT | Performed by: FAMILY MEDICINE

## 2019-01-11 PROCEDURE — 81001 URINALYSIS AUTO W/SCOPE: CPT | Performed by: FAMILY MEDICINE

## 2019-01-11 PROCEDURE — 99285 EMERGENCY DEPT VISIT HI MDM: CPT | Mod: 25

## 2019-01-11 RX ORDER — HYDROCHLOROTHIAZIDE 25 MG/1
25 TABLET ORAL DAILY
Status: DISCONTINUED | OUTPATIENT
Start: 2019-01-11 | End: 2019-01-11 | Stop reason: HOSPADM

## 2019-01-11 RX ORDER — CEFDINIR 300 MG/1
300 CAPSULE ORAL 2 TIMES DAILY
Qty: 14 CAPSULE | Refills: 0 | Status: SHIPPED | OUTPATIENT
Start: 2019-01-11 | End: 2019-01-18

## 2019-01-11 RX ORDER — ONDANSETRON 4 MG/1
4 TABLET, ORALLY DISINTEGRATING ORAL ONCE
Status: COMPLETED | OUTPATIENT
Start: 2019-01-11 | End: 2019-01-11

## 2019-01-11 RX ORDER — IBUPROFEN 800 MG/1
800 TABLET, FILM COATED ORAL ONCE
Status: COMPLETED | OUTPATIENT
Start: 2019-01-11 | End: 2019-01-11

## 2019-01-11 RX ORDER — TRAMADOL HYDROCHLORIDE 50 MG/1
50-100 TABLET ORAL EVERY 6 HOURS PRN
Qty: 15 TABLET | Refills: 0 | Status: SHIPPED | OUTPATIENT
Start: 2019-01-11 | End: 2019-02-10

## 2019-01-11 RX ORDER — LISINOPRIL 5 MG/1
20 TABLET ORAL ONCE
Status: COMPLETED | OUTPATIENT
Start: 2019-01-11 | End: 2019-01-11

## 2019-01-11 RX ORDER — MORPHINE SULFATE 4 MG/ML
4 INJECTION, SOLUTION INTRAMUSCULAR; INTRAVENOUS ONCE
Status: COMPLETED | OUTPATIENT
Start: 2019-01-11 | End: 2019-01-11

## 2019-01-11 RX ORDER — CEFTRIAXONE SODIUM 1 G/50ML
1 INJECTION, SOLUTION INTRAVENOUS ONCE
Status: COMPLETED | OUTPATIENT
Start: 2019-01-11 | End: 2019-01-11

## 2019-01-11 RX ADMIN — ONDANSETRON 4 MG: 4 TABLET, ORALLY DISINTEGRATING ORAL at 06:54

## 2019-01-11 RX ADMIN — HYDROCHLOROTHIAZIDE 25 MG: 25 TABLET ORAL at 09:49

## 2019-01-11 RX ADMIN — MORPHINE SULFATE 4 MG: 4 INJECTION INTRAVENOUS at 08:31

## 2019-01-11 RX ADMIN — LIDOCAINE HYDROCHLORIDE 30 ML: 20 SOLUTION ORAL; TOPICAL at 10:03

## 2019-01-11 RX ADMIN — CEFTRIAXONE SODIUM 1 G: 1 INJECTION, SOLUTION INTRAVENOUS at 08:47

## 2019-01-11 RX ADMIN — LISINOPRIL 20 MG: 5 TABLET ORAL at 09:48

## 2019-01-11 RX ADMIN — SODIUM CHLORIDE 1000 ML: 9 INJECTION, SOLUTION INTRAVENOUS at 08:30

## 2019-01-11 RX ADMIN — IBUPROFEN 800 MG: 800 TABLET ORAL at 06:54

## 2019-01-11 ASSESSMENT — ENCOUNTER SYMPTOMS
WOUND: 0
COLOR CHANGE: 0
DYSURIA: 1
ANAL BLEEDING: 0
NECK PAIN: 0
DIAPHORESIS: 0
VOMITING: 0
BLOOD IN STOOL: 0
CONFUSION: 0
BACK PAIN: 0
SHORTNESS OF BREATH: 0
LIGHT-HEADEDNESS: 0
CHEST TIGHTNESS: 0
VOICE CHANGE: 0
HEADACHES: 0
ABDOMINAL DISTENTION: 0
NECK STIFFNESS: 0
CHILLS: 0
WHEEZING: 0
HEMATURIA: 0
FREQUENCY: 1
COUGH: 0
PALPITATIONS: 0
FEVER: 0
ABDOMINAL PAIN: 0
MYALGIAS: 0
FLANK PAIN: 1
NAUSEA: 1
NUMBNESS: 0
ARTHRALGIAS: 0
DIZZINESS: 0

## 2019-01-11 NOTE — ED AVS SNAPSHOT
[facility]  [address] [address]  Phone:  [phone]                                    Bibiana Stewart   MRN: [id number]    Department:  [facility]   Date of Visit:  1/11/2019           After Visit Summary Signature Page    I have received my discharge instructions, and my questions have been answered. I have discussed any challenges I see with this plan with the nurse or doctor.    ..........................................................................................................................................  Patient/Patient Representative Signature      ..........................................................................................................................................  Patient Representative Print Name and Relationship to Patient    ..................................................               ................................................  Date                                   Time    ..........................................................................................................................................  Reviewed by Signature/Title    ...................................................              ..............................................  Date                                               Time          22EPIC Rev 08/18

## 2019-01-11 NOTE — ED NOTES
Patient placed on call light to state she is unable to urinate at this time. Was in bathroom trying to give a urine sample for over 10 minutes. Ambulated to bed. Call light within reach. Instructed to press call light when she is able to give a urine sample.

## 2019-01-11 NOTE — ED NOTES
Encouraged to drink the water and to try again to void for a urine sample, told to press call light when urine sample completed.

## 2019-01-11 NOTE — ED NOTES
Instructed on process for giving urine sample. Instructed to place on call light when done. Ambulated into bathroom.

## 2019-01-11 NOTE — ED PROVIDER NOTES
History     Chief Complaint   Patient presents with     Rule out Urinary Tract Infection     pain with urination that started yesterday       Female Gu Problem   This is a recurrent problem. The current episode started yesterday. The problem occurs constantly. The problem has not changed since onset.Pertinent negatives include no chest pain, no abdominal pain, no headaches and no shortness of breath.         Problem List:    Patient Active Problem List    Diagnosis Date Noted     Adjustment disorder with mixed anxiety and depressed mood 08/07/2018     Priority: Medium        Past Medical History:    No past medical history on file.    Past Surgical History:    No past surgical history on file.    Family History:    No family history on file.    Social History:  Marital Status:  Single [1]  Social History     Tobacco Use     Smoking status: Current Every Day Smoker     Packs/day: 0.50     Smokeless tobacco: Never Used     Tobacco comment: trying to quit, has cut down   Substance Use Topics     Alcohol use: Yes     Comment: rarely     Drug use: No        Medications:      cefdinir (OMNICEF) 300 MG capsule   traMADol (ULTRAM) 50 MG tablet   IBUPROFEN PO   lisinopril-hydrochlorothiazide (PRINZIDE/ZESTORETIC) 20-25 MG per tablet         Review of Systems   Constitutional: Negative for chills, diaphoresis and fever.   HENT: Negative for voice change.    Eyes: Negative for visual disturbance.   Respiratory: Negative for cough, chest tightness, shortness of breath and wheezing.    Cardiovascular: Negative for chest pain, palpitations and leg swelling.   Gastrointestinal: Positive for nausea. Negative for abdominal distention, abdominal pain, anal bleeding, blood in stool and vomiting.   Genitourinary: Positive for dysuria, flank pain, frequency and urgency. Negative for decreased urine volume and hematuria.   Musculoskeletal: Negative for arthralgias, back pain, gait problem, myalgias, neck pain and neck stiffness.    Skin: Negative for color change, pallor, rash and wound.   Neurological: Negative for dizziness, syncope, light-headedness, numbness and headaches.   Psychiatric/Behavioral: Negative for confusion and suicidal ideas.       Physical Exam   BP: (!) 147/104  Pulse: 102  Heart Rate: 80  Temp: 96.5  F (35.8  C)  Resp: 19  Weight: 54.4 kg (120 lb)  SpO2: 99 %      Physical Exam   Constitutional: She is oriented to person, place, and time. She appears well-developed and well-nourished.   HENT:   Head: Normocephalic and atraumatic.   Mouth/Throat: No oropharyngeal exudate.   Eyes: Conjunctivae are normal. Pupils are equal, round, and reactive to light.   Neck: Normal range of motion. Neck supple. No JVD present. No tracheal deviation present. No thyromegaly present.   Cardiovascular: Normal rate, regular rhythm, normal heart sounds and intact distal pulses. Exam reveals no gallop and no friction rub.   No murmur heard.  Pulmonary/Chest: Effort normal and breath sounds normal. No stridor. No respiratory distress. She has no wheezes. She has no rales. She exhibits no tenderness.   Abdominal: Soft. Bowel sounds are normal. She exhibits no distension and no mass. There is no tenderness. There is CVA tenderness. There is no rebound and no guarding.   Musculoskeletal: Normal range of motion. She exhibits no edema or tenderness.   Lymphadenopathy:     She has no cervical adenopathy.   Neurological: She is alert and oriented to person, place, and time.   Skin: Skin is warm and dry. No rash noted. No erythema. No pallor.   Psychiatric: Her behavior is normal.   Nursing note and vitals reviewed.      ED Course        Procedures      Results for orders placed or performed during the hospital encounter of 01/11/19 (from the past 24 hour(s))   Routine UA with microscopic   Result Value Ref Range    Color Urine Yellow     Appearance Urine Slightly Cloudy     Glucose Urine Negative NEG^Negative mg/dL    Bilirubin Urine Negative  NEG^Negative    Ketones Urine Negative NEG^Negative mg/dL    Specific Gravity Urine 1.029 1.003 - 1.035    Blood Urine Trace (A) NEG^Negative    pH Urine 5.5 4.7 - 8.0 pH    Protein Albumin Urine 30 (A) NEG^Negative mg/dL    Urobilinogen mg/dL 2.0 0.0 - 2.0 mg/dL    Nitrite Urine Positive (A) NEG^Negative    Leukocyte Esterase Urine Small (A) NEG^Negative    Source Midstream Urine     WBC Urine 24 (H) 0 - 5 /HPF    RBC Urine 1 0 - 2 /HPF    WBC Clumps Present (A) NEG^Negative /HPF    Bacteria Urine Few (A) NEG^Negative /HPF    Squamous Epithelial /HPF Urine 32 (H) 0 - 1 /HPF    Mucous Urine Present (A) NEG^Negative /LPF    Hyaline Casts 4 (A) OTO2^O - 2 /LPF   CBC with platelets differential   Result Value Ref Range    WBC 6.8 4.0 - 11.0 10e9/L    RBC Count 5.34 (H) 3.8 - 5.2 10e12/L    Hemoglobin 16.0 (H) 11.7 - 15.7 g/dL    Hematocrit 45.7 35.0 - 47.0 %    MCV 86 78 - 100 fl    MCH 30.0 26.5 - 33.0 pg    MCHC 35.0 31.5 - 36.5 g/dL    RDW 12.4 10.0 - 15.0 %    Platelet Count 236 150 - 450 10e9/L    Diff Method Automated Method     % Neutrophils 85.1 %    % Lymphocytes 8.9 %    % Monocytes 5.5 %    % Eosinophils 0.0 %    % Basophils 0.1 %    % Immature Granulocytes 0.4 %    Nucleated RBCs 0 0 /100    Absolute Neutrophil 5.8 1.6 - 8.3 10e9/L    Absolute Lymphocytes 0.6 (L) 0.8 - 5.3 10e9/L    Absolute Monocytes 0.4 0.0 - 1.3 10e9/L    Absolute Eosinophils 0.0 0.0 - 0.7 10e9/L    Absolute Basophils 0.0 0.0 - 0.2 10e9/L    Abs Immature Granulocytes 0.0 0 - 0.4 10e9/L    Absolute Nucleated RBC 0.0    Comprehensive metabolic panel   Result Value Ref Range    Sodium 136 133 - 144 mmol/L    Potassium 3.4 3.4 - 5.3 mmol/L    Chloride 104 94 - 109 mmol/L    Carbon Dioxide 24 20 - 32 mmol/L    Anion Gap 8 3 - 14 mmol/L    Glucose 132 (H) 70 - 99 mg/dL    Urea Nitrogen 13 7 - 30 mg/dL    Creatinine 0.70 0.52 - 1.04 mg/dL    GFR Estimate >90 >60 mL/min/[1.73_m2]    GFR Estimate If Black >90 >60 mL/min/[1.73_m2]    Calcium 8.3  (L) 8.5 - 10.1 mg/dL    Bilirubin Total 1.0 0.2 - 1.3 mg/dL    Albumin 3.2 (L) 3.4 - 5.0 g/dL    Protein Total 6.4 (L) 6.8 - 8.8 g/dL    Alkaline Phosphatase 86 40 - 150 U/L    ALT 40 0 - 50 U/L    AST 22 0 - 45 U/L   Lactic acid whole blood   Result Value Ref Range    Lactic Acid 1.7 0.7 - 2.0 mmol/L   Troponin I   Result Value Ref Range    Troponin I ES <0.015 0.000 - 0.045 ug/L     EKG shows NSR with nonspecific T wave abnormality and prolonged QT.  Rate is 72.  Medications   hydrochlorothiazide (HYDRODIURIL) tablet 25 mg (25 mg Oral Given 1/11/19 0949)   ondansetron (ZOFRAN-ODT) ODT tab 4 mg (4 mg Oral Given 1/11/19 0654)   ibuprofen (ADVIL/MOTRIN) tablet 800 mg (800 mg Oral Given 1/11/19 0654)   0.9% sodium chloride BOLUS (0 mLs Intravenous Stopped 1/11/19 0930)   morphine (PF) injection 4 mg (4 mg Intravenous Given 1/11/19 0831)   cefTRIAXone in d5w (ROCEPHIN) intermittent infusion 1 g (0 g Intravenous Stopped 1/11/19 0920)   lisinopril (PRINIVIL/ZESTRIL) tablet 20 mg (20 mg Oral Given 1/11/19 0948)   lidocaine VISCOUS (XYLOCAINE) 2 % 15 mL, alum & mag hydroxide-simethicone (MYLANTA ES/MAALOX  ES) 15 mL GI Cocktail (30 mLs Oral Given 1/11/19 1003)       Assessments & Plan (with Medical Decision Making)   Urinary symptoms  , bladder pain with radiation to left flank  Accompanied with headache , nausea  Denies fever chills  Hx of recurrent UTI  Ibuprofen + zofran given  Pt continued to have left flank pain  IVF, IV ceftriaxone, morphine given    S/o to Dr Macy Triana at the change of shift    Patient developed chest pain, had an elevated blood pressure of 184/101.  EKG performed and has a nonspecific T wave abnormality but no other signs of ischemia.  Troponin is negative.  Patient was given GI cocktail with improvement in her symptoms.    I have reviewed the nursing notes.    I have reviewed the findings, diagnosis, plan and need for follow up with the patient.       Medication List      Started     cefdinir 300 MG capsule  Commonly known as:  OMNICEF  300 mg, Oral, 2 TIMES DAILY     traMADol 50 MG tablet  Commonly known as:  ULTRAM   mg, Oral, EVERY 6 HOURS PRN            Final diagnoses:   Pyelonephritis, acute       1/11/2019   [facility]     Shantel Ram MD  01/11/19 1045       Shantel Ram MD  01/11/19 1055       Shantel Ram MD  01/13/19 6410

## 2019-01-11 NOTE — ED NOTES
Ambulated into ED room 1 independently with c/o pain with urination. States was diagnosed with a UTI and took an antibiotic but did not follow up with doctor after. States symptoms of UTI are back and she is concerned she has a UTI again or UTI did not go away with antibiotic treatment. Rates pain 10/10. Denies any fevers. Call light within reach.

## 2019-01-11 NOTE — ED NOTES
Condition stable, understands discharge instructions.  Prescription x 1 e-scribed to Pharmacy [facility] choice and prescription x 1 given to patient.  Discharged home.

## 2019-01-11 NOTE — ED NOTES
"Patient still in bathroom. States, \"I can't go right now.\" Patient states she will try for a bit longer and turn on call light when done.   "

## 2019-01-14 LAB
BACTERIA SPEC CULT: ABNORMAL
SPECIMEN SOURCE: ABNORMAL

## 2019-01-17 LAB
BACTERIA SPEC CULT: NORMAL
BACTERIA SPEC CULT: NORMAL
SPECIMEN SOURCE: NORMAL
SPECIMEN SOURCE: NORMAL

## 2020-10-12 ENCOUNTER — TRANSFERRED RECORDS (OUTPATIENT)
Dept: MULTI SPECIALTY CLINIC | Facility: CLINIC | Age: 60
End: 2020-10-12

## 2020-10-12 LAB
HPV ABSTRACT: NORMAL
PAP-ABSTRACT: NORMAL

## 2022-01-05 ENCOUNTER — ALLIED HEALTH/NURSE VISIT (OUTPATIENT)
Dept: FAMILY MEDICINE | Facility: OTHER | Age: 62
End: 2022-01-05
Attending: FAMILY MEDICINE
Payer: COMMERCIAL

## 2022-01-05 DIAGNOSIS — Z20.822 EXPOSURE TO 2019 NOVEL CORONAVIRUS: ICD-10-CM

## 2022-01-05 DIAGNOSIS — R05.9 COUGH: Primary | ICD-10-CM

## 2022-01-05 PROCEDURE — U0005 INFEC AGEN DETEC AMPLI PROBE: HCPCS | Mod: ZL

## 2022-01-05 PROCEDURE — C9803 HOPD COVID-19 SPEC COLLECT: HCPCS

## 2022-01-05 NOTE — PROGRESS NOTES
Patient swabbed for COVID-19 testing.  Cough exposure  Beatris Hedrick MA on 1/5/2022 at 10:24 AM

## 2022-01-07 ENCOUNTER — TELEPHONE (OUTPATIENT)
Dept: FAMILY MEDICINE | Facility: OTHER | Age: 62
End: 2022-01-07
Payer: COMMERCIAL

## 2022-01-07 LAB — SARS-COV-2 RNA RESP QL NAA+PROBE: POSITIVE

## 2022-01-07 NOTE — TELEPHONE ENCOUNTER
Coronavirus (COVID-19) Notification    Reason for call  Notify of POSITIVE  COVID-19 lab result, assess symptoms,  review [facility] recommendations    Lab Result   Lab test for 2019-nCoV rRt-PCR or SARS-COV-2 PCR  Oropharyngeal AND/OR nasopharyngeal swabs were POSITIVE for 2019-nCoV RNA [OR] SARS-COV-2 RNA (COVID-19) RNA     We have been unable to reach Patient by phone at this time to notify of their Positive COVID-19 result.  Left voicemail message requesting a call back to [phone] [facility] for results.        POSITIVE COVID-19 Letter sent.    Krissy Wells

## 2022-01-07 NOTE — TELEPHONE ENCOUNTER
"Coronavirus (COVID-19) Notification    Pt acknowleded it may take 7-10d to receive copy of covid results in the mail. Verfied address.      Caller Name (Patient, parent, daughter/son, grandparent, etc)  Pt    Reason for call  Notify of Positive Coronavirus (COVID-19) lab results, assess symptoms,  review  [facility] recommendations    Lab Result    Lab test:  2019-nCoV rRt-PCR or SARS-CoV-2 PCR    Oropharyngeal AND/OR nasopharyngeal swabs is POSITIVE for 2019-nCoV RNA/SARS-COV-2 PCR (COVID-19 virus)    RN Recommendations/Instructions per [facility] Coronavirus COVID-19 recommendations    Brief introduction script  Introduce self then review script:  \"I am calling on behalf of [employer].  We were notified that your Coronavirus test (COVID-19) for was POSITIVE for the virus.  I have some information to relay to you but first I wanted to mention that the [place] Dept of Health will be contacting you shortly [it's possible MD already called Patient] to talk to you more about how you are feeling and other people you have had contact with who might now also have the virus.  Also,  [facility] is Partnering with the [facility] for Covid-19 research, you may be contacted directly by research staff.\"    Assessment (Inquire about Patient's current symptoms)   Assessment   Current Symptoms at time of phone call: (if no symptoms, document No symptoms] Coughing, fatigue, body aches   Symptoms onset (if applicable) 01/01/22     If at time of call, Patients symptoms hare worsened, the Patient should contact 911 or have someone drive them to Emergency Dept promptly:      If Patient calling 911, inform 911 personal that you have tested positive for the Coronavirus (COVID-19).  Place mask on and await 911 to arrive.    If Emergency Dept, If possible, please have another adult drive you to the Emergency Dept but you need to wear mask when in contact with other people.      Monoclonal Antibody " Administration    You may be eligible to receive a new treatment with a monoclonal antibody for preventing hospitalization in patients at high risk for complications from COVID-19.   This medication is still experimental and available on a limited basis; it is given through an IV and must be given at an infusion center. Please note that not all people who are eligible will receive the medication since it is in limited supply.     Are you interested in being considered for this medication?  Yes.   Is the patient symptomatic?  Yes. Is the patient 18 years of age or older? Yes.  Is the patient newly (within the last week) on supplemental oxygen or requiring more oxygen than usual?  No. Patient criteria for selection: Is the patients weight equal to or greater than 40 kg (88 lbs)? Yes.  Is the patient's age 65 years or older?  No. Is the patient 55 years or older and have one or more of the following conditions: Hypertension, CHF, COPD/Chronic pulmonary disease?  No. Is the patient 18 years or older and has one or more of the following conditions: Chronic Kidney Disease, Diabetes Mellitus, BMI equal to or greater than 35, Immunosuppressive Disease or taking Immunosuppressive medications?  No.  Patient does not qualify.  Does the patient fit the criteria: No    Review information with Patient    Your result was positive. This means you have COVID-19 (coronavirus).  We have sent you a letter that reviews the information that I'll be reviewing with you now.    How can I protect others?    If you have symptoms: stay home and away from others (self-isolate) until:    You've had no fever--and no medicine that reduces fever--for 1 full day (24 hours). And       Your other symptoms have gotten better. For example, your cough or breathing has improved. And     At least 10 days have passed since your symptoms started. (If you've been told by a doctor that you have a weak immune system, wait 20 days.)     If you don't have  symptoms: Stay home and away from others (self-isolate) until at least 10 days have passed since your first positive COVID-19 test. (Date test collected)    During this time:    Stay in your own room, including for meals. Use your own bathroom if you can.    Stay away from others in your home. No hugging, kissing or shaking hands. No visitors.     Don't go to work, school or anywhere else.     Clean  high touch  surfaces often (doorknobs, counters, handles, etc.). Use a household cleaning spray or wipes. You'll find a full list on the EPA website at www.epa.gov/pesticide-registration/list-n-disinfectants-use-against-sars-cov-2.     Cover your mouth and nose with a mask, tissue or other face covering to avoid spreading germs.    Wash your hands and face often with soap and water.    Make a list of people you have been in close contact with recently, even if either of you wore a face covering.   - Start your list from 2 days before you became ill or had a positive test.  - Include anyone that was within 6 feet of you for a cumulative total of 15 minutes or more in 24 hours. (Example: if you sat next to Connor for 5 minutes in the morning and 10 minutes in the afternoon, then you were in close contact for 15 minutes total that day. Connor would be added to your list.)    A public health worker will call or text you. It is important that you answer. They will ask you questions about possible exposures to COVID-19, such as people you have been in direct contact with and places you have visited.    Tell the people on your list that you have COVID-19; they should stay away from others for 14 days starting from the last time they were in contact with you (unless you are told something different from a public health worker).     Caregivers in these groups are at risk for severe illness due to COVID-19:  o People 65 years and older  o People who live in a nursing home or long-term care facility  o People with chronic disease  (lung, heart, cancer, diabetes, kidney, liver, immunologic)  o People who have a weakened immune system, including those who:  - Are in cancer treatment  - Take medicine that weakens the immune system, such as corticosteroids  - Had a bone marrow or organ transplant  - Have an immune deficiency  - Have poorly controlled HIV or AIDS  - Are obese (body mass index of 40 or higher)  - Smoke regularly    Caregivers should wear gloves while washing dishes, handling laundry and cleaning bedrooms and bathrooms.    Wash and dry laundry with special caution. Don't shake dirty laundry, and use the warmest water setting you can.    If you have a weakened immune system, ask your doctor about other actions you should take.    For more tips, go to www.cdc.gov/coronavirus/2019-ncov/downloads/10Things.pdf.    You should not go back to work until you meet the guidelines above for ending your home isolation. You don't need to be retested for COVID-19 before going back to work--studies show that you won't spread the virus if it's been at least 10 days since your symptoms started (or 20 days, if you have a weak immune system).    Employers: This document serves as formal notice of your employee's medical guidelines for going back to work. They must meet the above guidelines before going back to work in person.    How can I take care of myself?    1. Get lots of rest. Drink extra fluids (unless a doctor has told you not to).    2. Take Tylenol (acetaminophen) for fever or pain. If you have liver or kidney problems, ask your family doctor if it's okay to take Tylenol.     Take either:     650 mg (two 325 mg pills) every 4 to 6 hours, or     1,000 mg (two 500 mg pills) every 8 hours as needed.     Note: Don't take more than 3,000 mg in one day. Acetaminophen is found in many medicines (both prescribed and over-the-counter medicines). Read all labels to be sure you don't take too much.    For children, check the Tylenol bottle for the right  dose (based on their age or weight).    3. If you have other health problems (like cancer, heart failure, an organ transplant or severe kidney disease): Call your specialty clinic if you don't feel better in the next 2 days.    4. Know when to call 911: Emergency warning signs include:    Trouble breathing or shortness of breath    Pain or pressure in the chest that doesn't go away    Feeling confused like you haven't felt before, or not being able to wake up    Bluish-colored lips or face    5. Sign up for [employer]. We know it's scary to hear that you have COVID-19. We want to track your symptoms to make sure you're okay over the next 2 weeks. Please look for an email from [employer]--this is a free, online program that we'll use to keep in touch. To sign up, follow the link in the email. Learn more at [email or web address].    Where can I get more information?    [facility]: [email or web address]    Coronavirus Basics: [email or web address]/coronavirus/basics.html    What to Do If You're Sick: www.cdc.gov/coronavirus/2019-ncov/about/steps-when-sick.html    Ending Home Isolation: www.cdc.gov/coronavirus/2019-ncov/hcp/disposition-in-home-patients.html     Caring for Someone with COVID-19: www.cdc.gov/coronavirus/2019-ncov/if-you-are-sick/care-for-someone.html     [facility] clinical trials (COVID-19 research studies): [email or web address]     A Positive COVID-19 letter will be sent via [employer] or the mail. (Exception, no letters sent to Presurgerical/Preprocedure Patients)    Krissy Wells

## 2022-06-17 NOTE — PROGRESS NOTES
CT Head Neck Angio w/o & w Contrast   IMPRESSION:    Mild, less than 50% stenosis of the right internal carotid artery due  to atherosclerotic plaque at the carotid bifurcation.    Symmetric, slightly small caliber of the intracranial internal carotid  arteries (appearing smaller for example than the dominant left  vertebral artery) is nonspecific and may simply reflect  atherosclerotic disease.    Patient transferred to [facility] for definitive care.
Head CT w/o contrast     IMPRESSION: Technically age indeterminate ischemic injuries of the  right frontal corona radiata/centrum semiovale deep white matter, left  corona radiata and left thalamus absent comparisons. These may all be  chronic. An injury to the right lentiform nucleus is CSF-like and  therefore reflects chronic encephalomalacia.        Patient transferred to [facility] for definitive care.
[facility] aides

## 2023-01-09 ENCOUNTER — OFFICE VISIT (OUTPATIENT)
Dept: INTERNAL MEDICINE | Facility: OTHER | Age: 63
End: 2023-01-09
Attending: INTERNAL MEDICINE
Payer: COMMERCIAL

## 2023-01-09 VITALS
RESPIRATION RATE: 16 BRPM | OXYGEN SATURATION: 99 % | BODY MASS INDEX: 21.9 KG/M2 | SYSTOLIC BLOOD PRESSURE: 136 MMHG | DIASTOLIC BLOOD PRESSURE: 84 MMHG | HEIGHT: 61 IN | WEIGHT: 116 LBS | HEART RATE: 64 BPM | TEMPERATURE: 97.5 F

## 2023-01-09 DIAGNOSIS — Z86.73 HISTORY OF TIA (TRANSIENT ISCHEMIC ATTACK) AND STROKE: ICD-10-CM

## 2023-01-09 DIAGNOSIS — Z12.31 ENCOUNTER FOR SCREENING MAMMOGRAM FOR BREAST CANCER: ICD-10-CM

## 2023-01-09 DIAGNOSIS — G43.909 MIGRAINE WITHOUT STATUS MIGRAINOSUS, NOT INTRACTABLE, UNSPECIFIED MIGRAINE TYPE: ICD-10-CM

## 2023-01-09 DIAGNOSIS — R25.2 LEG CRAMP: ICD-10-CM

## 2023-01-09 DIAGNOSIS — Z12.11 SCREENING FOR COLON CANCER: ICD-10-CM

## 2023-01-09 DIAGNOSIS — Z11.59 ENCOUNTER FOR HEPATITIS C SCREENING TEST FOR LOW RISK PATIENT: ICD-10-CM

## 2023-01-09 DIAGNOSIS — I10 BENIGN ESSENTIAL HYPERTENSION: ICD-10-CM

## 2023-01-09 DIAGNOSIS — Z79.899 HIGH RISK MEDICATION USE: ICD-10-CM

## 2023-01-09 DIAGNOSIS — Z87.891 PERSONAL HISTORY OF TOBACCO USE: ICD-10-CM

## 2023-01-09 DIAGNOSIS — F43.23 ADJUSTMENT DISORDER WITH MIXED ANXIETY AND DEPRESSED MOOD: Primary | ICD-10-CM

## 2023-01-09 DIAGNOSIS — Z13.1 SCREENING FOR DIABETES MELLITUS: ICD-10-CM

## 2023-01-09 PROBLEM — B97.7 HIGH RISK HPV INFECTION: Status: ACTIVE | Noted: 2020-11-19

## 2023-01-09 PROBLEM — I65.21 ATHEROSCLEROSIS OF RIGHT CAROTID ARTERY: Status: ACTIVE | Noted: 2020-09-08

## 2023-01-09 LAB
ALBUMIN SERPL BCG-MCNC: 4.1 G/DL (ref 3.5–5.2)
ALP SERPL-CCNC: 77 U/L (ref 35–104)
ALT SERPL W P-5'-P-CCNC: 16 U/L (ref 10–35)
ANION GAP SERPL CALCULATED.3IONS-SCNC: 8 MMOL/L (ref 7–15)
AST SERPL W P-5'-P-CCNC: 18 U/L (ref 10–35)
BILIRUB SERPL-MCNC: 0.5 MG/DL
BUN SERPL-MCNC: 21.3 MG/DL (ref 8–23)
CALCIUM SERPL-MCNC: 9.4 MG/DL (ref 8.8–10.2)
CHLORIDE SERPL-SCNC: 106 MMOL/L (ref 98–107)
CREAT SERPL-MCNC: 0.88 MG/DL (ref 0.51–0.95)
DEPRECATED HCO3 PLAS-SCNC: 27 MMOL/L (ref 22–29)
ERYTHROCYTE [DISTWIDTH] IN BLOOD BY AUTOMATED COUNT: 12.6 % (ref 10–15)
GFR SERPL CREATININE-BSD FRML MDRD: 74 ML/MIN/1.73M2
GLUCOSE SERPL-MCNC: 92 MG/DL (ref 70–99)
HCT VFR BLD AUTO: 44.9 % (ref 35–47)
HGB BLD-MCNC: 15.2 G/DL (ref 11.7–15.7)
MAGNESIUM SERPL-MCNC: 2.1 MG/DL (ref 1.7–2.3)
MCH RBC QN AUTO: 29.5 PG (ref 26.5–33)
MCHC RBC AUTO-ENTMCNC: 33.9 G/DL (ref 31.5–36.5)
MCV RBC AUTO: 87 FL (ref 78–100)
PLATELET # BLD AUTO: 277 10E3/UL (ref 150–450)
POTASSIUM SERPL-SCNC: 4.1 MMOL/L (ref 3.4–5.3)
PROT SERPL-MCNC: 6.8 G/DL (ref 6.4–8.3)
RBC # BLD AUTO: 5.15 10E6/UL (ref 3.8–5.2)
SODIUM SERPL-SCNC: 141 MMOL/L (ref 136–145)
WBC # BLD AUTO: 5.8 10E3/UL (ref 4–11)

## 2023-01-09 PROCEDURE — 36415 COLL VENOUS BLD VENIPUNCTURE: CPT | Mod: ZL | Performed by: INTERNAL MEDICINE

## 2023-01-09 PROCEDURE — 86803 HEPATITIS C AB TEST: CPT | Mod: ZL | Performed by: INTERNAL MEDICINE

## 2023-01-09 PROCEDURE — 83735 ASSAY OF MAGNESIUM: CPT | Mod: ZL | Performed by: INTERNAL MEDICINE

## 2023-01-09 PROCEDURE — G0463 HOSPITAL OUTPT CLINIC VISIT: HCPCS | Mod: 25

## 2023-01-09 PROCEDURE — 85027 COMPLETE CBC AUTOMATED: CPT | Mod: ZL | Performed by: INTERNAL MEDICINE

## 2023-01-09 PROCEDURE — G0463 HOSPITAL OUTPT CLINIC VISIT: HCPCS

## 2023-01-09 PROCEDURE — 99204 OFFICE O/P NEW MOD 45 MIN: CPT | Mod: 25 | Performed by: INTERNAL MEDICINE

## 2023-01-09 PROCEDURE — 80053 COMPREHEN METABOLIC PANEL: CPT | Mod: ZL | Performed by: INTERNAL MEDICINE

## 2023-01-09 PROCEDURE — G0296 VISIT TO DETERM LDCT ELIG: HCPCS | Performed by: INTERNAL MEDICINE

## 2023-01-09 RX ORDER — AMLODIPINE BESYLATE 5 MG/1
5 TABLET ORAL DAILY
Qty: 90 TABLET | Refills: 1 | Status: SHIPPED | OUTPATIENT
Start: 2023-01-09 | End: 2024-03-20

## 2023-01-09 RX ORDER — BUSPIRONE HYDROCHLORIDE 7.5 MG/1
7.5 TABLET ORAL 2 TIMES DAILY
Qty: 180 TABLET | Refills: 1 | Status: SHIPPED | OUTPATIENT
Start: 2023-01-09 | End: 2024-03-20

## 2023-01-09 RX ORDER — AMLODIPINE BESYLATE 5 MG/1
1 TABLET ORAL DAILY
COMMUNITY
Start: 2021-05-11 | End: 2023-01-09

## 2023-01-09 RX ORDER — MULTIVITAMIN WITH IRON
1 TABLET ORAL DAILY
Qty: 90 TABLET | Refills: 1 | Status: SHIPPED | OUTPATIENT
Start: 2023-01-09

## 2023-01-09 RX ORDER — BUSPIRONE HYDROCHLORIDE 7.5 MG/1
1 TABLET ORAL 2 TIMES DAILY
COMMUNITY
Start: 2021-05-11 | End: 2023-01-09

## 2023-01-09 RX ORDER — HYDROXYZINE HYDROCHLORIDE 25 MG/1
25 TABLET, FILM COATED ORAL 3 TIMES DAILY PRN
Qty: 90 TABLET | Refills: 3 | Status: SHIPPED | OUTPATIENT
Start: 2023-01-09

## 2023-01-09 RX ORDER — RIMEGEPANT SULFATE 75 MG/75MG
75 TABLET, ORALLY DISINTEGRATING ORAL DAILY PRN
Qty: 30 TABLET | Refills: 5 | Status: SHIPPED | OUTPATIENT
Start: 2023-01-09 | End: 2024-03-20

## 2023-01-09 RX ORDER — CHLORTHALIDONE 25 MG/1
25 TABLET ORAL DAILY
COMMUNITY
Start: 2021-05-11 | End: 2023-01-09

## 2023-01-09 RX ORDER — CARVEDILOL 25 MG/1
25 TABLET ORAL 2 TIMES DAILY WITH MEALS
Qty: 90 TABLET | Refills: 1 | Status: SHIPPED | OUTPATIENT
Start: 2023-01-09 | End: 2024-03-20

## 2023-01-09 RX ORDER — CHLORTHALIDONE 25 MG/1
25 TABLET ORAL DAILY
Qty: 90 TABLET | Refills: 1 | Status: SHIPPED | OUTPATIENT
Start: 2023-01-09 | End: 2024-03-20

## 2023-01-09 RX ORDER — CARVEDILOL 25 MG/1
1 TABLET ORAL 2 TIMES DAILY WITH MEALS
COMMUNITY
Start: 2021-05-11 | End: 2023-01-09

## 2023-01-09 RX ORDER — RIMEGEPANT SULFATE 75 MG/75MG
TABLET, ORALLY DISINTEGRATING ORAL
COMMUNITY
Start: 2022-03-11 | End: 2023-01-09

## 2023-01-09 ASSESSMENT — PAIN SCALES - GENERAL: PAINLEVEL: SEVERE PAIN (6)

## 2023-01-09 NOTE — PROGRESS NOTES
Lung Cancer Screening Shared Decision Making Visit     Bibiana Stewart, a 62 year old female, is eligible for lung cancer screening    History   Smoking Status     Former     Packs/day: 0.50     Years: 42.00     Types: Cigarettes     Start date: 1/1/1980     Quit date: 11/1/2022   Smokeless Tobacco     Never     I have discussed with patient the risks and benefits of screening for lung cancer with low-dose CT.     The risks include:    radiation exposure: one low dose chest CT has as much ionizing radiation as about 15 chest x-rays, or 6 months of background radiation living in [place]      false positives: most findings/nodules are NOT cancer, but some might still require additional diagnostic evaluation, including biopsy    over-diagnosis: some slow growing cancers that might never have been clinically significant will be detected and treated unnecessarily     The benefit of early detection of lung cancer is contingent upon adherence to annual screening or more frequent follow up if indicated.     Furthermore, to benefit from screening, Bibiana must be willing and able to undergo diagnostic procedures, if indicated. Although no specific guide is available for determining severity of comorbidities, it is reasonable to withhold screening in patients who have greater mortality risk from other diseases.     We did discuss that the best way to prevent lung cancer is to not smoke.    Some patients may value a numeric estimation of lung cancer risk when evaluating if lung cancer screening is right for them, here is one calculator:    ShouldIScreen

## 2023-01-09 NOTE — NURSING NOTE
Patient presents to clinic today to establish care and review medications.    Medication review completed.    Advanced Care Planning discussed/reviewed.    FOOD SECURITY SCREENING QUESTIONS    The next two questions are to help us understand your food security.  If you are feeling you need any assistance in this area, we have resources available to support you today.    Hunger Vital Signs:  Within the past 12 months we worried whether our food would run out before we got money to buy more. Never  Within the past 12 months the food we bought just didn't last and we didn't have money to get more. Never    Jen Watters CMA(AAMA)..................1/9/2023   8:56 AM

## 2023-01-09 NOTE — PROGRESS NOTES
Assessment & Plan     Adjustment disorder with mixed anxiety and depressed mood  Medication sent in, encouraged to set up with mental health  - busPIRone (BUSPAR) 7.5 MG tablet; Take 1 tablet (7.5 mg) by mouth 2 times daily  - hydrOXYzine (ATARAX) 25 MG tablet; Take 1 tablet (25 mg) by mouth 3 times daily as needed for anxiety    Personal history of tobacco use  - Prof fee: Shared Decision Making for Lung Cancer Screening  - CT Chest Lung Cancer Scrn Low Dose wo; Future    History of TIA (transient ischemic attack) and stroke  Continue on aspirin and medications    Benign essential hypertension  Blood pressure is controlled, continue current medication  - amLODIPine (NORVASC) 5 MG tablet; Take 1 tablet (5 mg) by mouth daily  - carvedilol (COREG) 25 MG tablet; Take 1 tablet (25 mg) by mouth 2 times daily (with meals)  - chlorthalidone (HYGROTON) 25 MG tablet; Take 1 tablet (25 mg) by mouth daily    Leg cramp  -  we will check labs today and she was encouraged to start on magnesium.  This was sent in  - magnesium 250 MG tablet; Take 1 tablet (250 mg) by mouth daily  - Magnesium    Screening for diabetes mellitus  - Comprehensive metabolic panel    Encounter for screening mammogram for breast cancer  - MA Screening Bilateral w/ Manoj; Future    Encounter for hepatitis C screening test for low risk patient  - Hepatitis C antibody    Screening for colon cancer  - GRETEL([employer]); Future    High risk medication use  - CBC with platelets    Migraine without status migrainosus, not intractable, unspecified migraine type  - Controlled.  Continue current regimen.    - NURTEC 75 MG ODT tablet; Place 1 tablet (75 mg) under the tongue daily as needed for migraine - MAXIMUM OF 6 TABLETS PER 30 DAYS      Return in about 6 months (around 7/9/2023) for Annual Review with renewal of all medications, pap.    Kelly Wilson, [facility]    Diomedes   Bibiana is a 62 year old, presenting for the  following health issues:  Establish Care    She previously was seen at [facility].  She has moved to the area and needs a new doctor.    She has a history of TIA and hypertension.  She is on several medications for this including amlodipine, carvedilol, chlorthalidone and takes an aspirin.  Her blood pressure is better controlled now.    She has underlining anxiety.  She is on buspirone and is curious about other options.  We discussed possibly trying hydroxyzine which she is open to.  She has previously been on Prozac and Zoloft and did not feel well on these.  She was encouraged to consider seeing a mental health provider.    She has been having some leg cramps.  We discussed that this is likely related to her chlorthalidone.    She has underlining migraines.  She has been using Nurtec as needed.  She does need a new prescription for this    She is due for diabetes screening, mammogram, hepatitis C screening, colon cancer screening and lung cancer screening.  Orders are placed    History is discussed and updated on 1/9/2023 with patient.  It is current to the best of my knowledge as below.    Past Medical History:   Diagnosis Date     Adjustment disorder with mixed anxiety and depressed mood 08/07/2018     Atherosclerosis of right carotid artery 9/8/2020    Formatting of this note might be different from the original. Right common carotid 50%     Benign essential hypertension 1/9/2023     High risk HPV infection 11/19/2020    Formatting of this note might be different from the original. Nov. 2020-normal cytology but positive HPV 16; colposcopy without any lesions, and ECC with no dysplasia.  Recommending repeat cotesting in 1 year     Migraine      Migraine without status migrainosus, not intractable, unspecified migraine type 1/9/2023     TIA (transient ischemic attack)     multiple in past        Past Surgical History:   Procedure Laterality Date     TUBAL LIGATION  1987         Current Outpatient  Medications   Medication Sig Dispense Refill     amLODIPine (NORVASC) 5 MG tablet Take 1 tablet (5 mg) by mouth daily 90 tablet 1     aspirin (ASA) 81 MG EC tablet Take 81 mg by mouth daily       busPIRone (BUSPAR) 7.5 MG tablet Take 1 tablet (7.5 mg) by mouth 2 times daily 180 tablet 1     carvedilol (COREG) 25 MG tablet Take 1 tablet (25 mg) by mouth 2 times daily (with meals) 90 tablet 1     chlorthalidone (HYGROTON) 25 MG tablet Take 1 tablet (25 mg) by mouth daily 90 tablet 1     hydrOXYzine (ATARAX) 25 MG tablet Take 1 tablet (25 mg) by mouth 3 times daily as needed for anxiety 90 tablet 3     IBUPROFEN PO Take 800 mg by mouth daily        magnesium 250 MG tablet Take 1 tablet (250 mg) by mouth daily 90 tablet 1     NURTEC 75 MG ODT tablet Place 1 tablet (75 mg) under the tongue daily as needed for migraine - MAXIMUM OF 6 TABLETS PER 30 DAYS 30 tablet 5       Allergies   Allergen Reactions     Pcn [Penicillins]         Family History   Problem Relation Age of Onset     Myocardial Infarction Mother      Cirrhosis Father         alcohol     No Known Problems Sister      No Known Problems Brother      No Known Problems Brother      No Known Problems Brother      No Known Problems Brother      Heart Disease Brother      Heart Failure Brother      No Known Problems Son      Seizure Disorder Daughter        Family Status   Relation Name Status     Mo       Fa       Sis  Alive     Bro  Alive     Bro  Alive     Bro  Alive     Bro  Alive     Bro       Son Matthew Alive     Karthik Tomas Alive        Social History     Tobacco Use     Smoking status: Former     Packs/day: 0.50     Years: 42.00     Pack years: 21.00     Types: Cigarettes     Start date: 1980     Quit date: 2022     Years since quittin.1     Smokeless tobacco: Never     Tobacco comments:     trying to quit, has cut down - she states she is around people who smoke which makes it difficult, but hasn't had any herself for  "a few months 1/9/23   Substance Use Topics     Alcohol use: Yes     Comment: rarely       Social History     Social History Narrative    . On disability because of prior stroke.  2 kids, Matthew ([place]) and Genoveva ()          History of Present Illness       Reason for visit:  New patient    She eats 2-3 servings of fruits and vegetables daily.She consumes 2 sweetened beverage(s) daily.She exercises with enough effort to increase her heart rate 30 to 60 minutes per day.  She exercises with enough effort to increase her heart rate 7 days per week.   She is taking medications regularly.       Review of Systems   Denies any fevers, chills, SOB, chest pain, increased lower extremity edema, changes in bowel or bladder, blood in the stool or other concerns.         Objective    /84 (BP Location: Right arm, Patient Position: Sitting, Cuff Size: Adult Regular)   Pulse 64   Temp 97.5  F (36.4  C) (Temporal)   Resp 16   Ht 1.543 m (5' 0.75\")   Wt 52.6 kg (116 lb)   SpO2 99%   BMI 22.10 kg/m    Body mass index is 22.1 kg/m .  Physical Exam   GEN: Vitals reviewed. Healthy appearing. Patient is in no acute distress. Cooperative with exam.  HEENT: Normocephalic atraumatic.  Eyes grossly normal to inspection.  No discharge or erythema, or obvious scleral/conjunctival abnormalities.   NECK: Supple; no thyromegaly or masses noted.  No cervical or supraclavicular lymphadenopathy.  CV: Heart regular in rate and rhythm with no murmur.    LUNGS: No audible wheeze, cough, or visible cyanosis.  No visible retractions or increased work of breathing.  Lungs clear to auscultation bilaterally.    ABD:  nondistended  SKIN: Warm and dry to touch.  Visible skin clear. No significant rash, abnormal pigmentation or lesions.  EXT: No clubbing or cyanosis.  No peripheral edema.        "

## 2023-01-09 NOTE — LETTER
January 10, 2023      Bibiana Stewart  [address] [place] [address]   [facility] [id number]        Dear ,    We are writing to inform you of your test results.    Your test results fall within the expected range(s) or remain unchanged from previous results.  Please continue with current treatment plan.    Resulted Orders   Hepatitis C antibody   Result Value Ref Range    Hepatitis C Antibody Nonreactive Nonreactive    Narrative    Assay performance characteristics have not been established for newborns, infants, and children.   CBC with platelets   Result Value Ref Range    WBC Count 5.8 4.0 - 11.0 10e3/uL    RBC Count 5.15 3.80 - 5.20 10e6/uL    Hemoglobin 15.2 11.7 - 15.7 g/dL    Hematocrit 44.9 35.0 - 47.0 %    MCV 87 78 - 100 fL    MCH 29.5 26.5 - 33.0 pg    MCHC 33.9 31.5 - 36.5 g/dL    RDW 12.6 10.0 - 15.0 %    Platelet Count 277 150 - 450 10e3/uL   Comprehensive metabolic panel   Result Value Ref Range    Sodium 141 136 - 145 mmol/L    Potassium 4.1 3.4 - 5.3 mmol/L    Chloride 106 98 - 107 mmol/L    Carbon Dioxide (CO2) 27 22 - 29 mmol/L    Anion Gap 8 7 - 15 mmol/L    Urea Nitrogen 21.3 8.0 - 23.0 mg/dL    Creatinine 0.88 0.51 - 0.95 mg/dL    Calcium 9.4 8.8 - 10.2 mg/dL    Glucose 92 70 - 99 mg/dL    Alkaline Phosphatase 77 35 - 104 U/L    AST 18 10 - 35 U/L    ALT 16 10 - 35 U/L    Protein Total 6.8 6.4 - 8.3 g/dL    Albumin 4.1 3.5 - 5.2 g/dL    Bilirubin Total 0.5 <=1.2 mg/dL    GFR Estimate 74 >60 mL/min/1.73m2      Comment:      Effective December 21, 2021 eGFRcr in adults is calculated using the 2021 CKD-EPI creatinine equation which includes age and gender ( et al., NEJM, DOI: 10.1056/PJYYtm1013803)   Magnesium   Result Value Ref Range    Magnesium 2.1 1.7 - 2.3 mg/dL       If you have any questions or concerns, please call the clinic at the number listed above.       Sincerely,      Kelly Wilson DO

## 2023-01-10 LAB — HCV AB SERPL QL IA: NONREACTIVE

## 2023-02-03 ENCOUNTER — OFFICE VISIT (OUTPATIENT)
Dept: FAMILY MEDICINE | Facility: OTHER | Age: 63
End: 2023-02-03
Attending: INTERNAL MEDICINE
Payer: COMMERCIAL

## 2023-02-03 ENCOUNTER — NURSE TRIAGE (OUTPATIENT)
Dept: FAMILY MEDICINE | Facility: OTHER | Age: 63
End: 2023-02-03

## 2023-02-03 DIAGNOSIS — Z20.822 SUSPECTED 2019 NOVEL CORONAVIRUS INFECTION: Primary | ICD-10-CM

## 2023-02-03 LAB — SARS-COV-2 RNA RESP QL NAA+PROBE: NEGATIVE

## 2023-02-03 PROCEDURE — U0005 INFEC AGEN DETEC AMPLI PROBE: HCPCS | Mod: ZL

## 2023-02-03 NOTE — TELEPHONE ENCOUNTER
S-(situation): Patient called with symptoms of headache, cough and fatigue    B-(background): Symptoms started last night    A-(assessment):Patient reports headache, fatigue and cough. Patient denies any shortness of breath or chest pain    R-(recommendations): patient scheduled for nurse only swab.      Reason for Disposition    COVID-19 Testing, questions about    Additional Information    Negative: Severe difficulty breathing (struggling for each breath, unable to speak or cry, making grunting noises with each breath, severe retractions) (Triage tip: Listen to the child's breathing.)    Negative: Slow, shallow, weak breathing    Negative: [1] Bluish (or gray) lips or face now AND [2] persists when not coughing    Negative: Difficult to awaken or not alert when awake (confusion)    Negative: Very weak (doesn't move or make eye contact)    Negative: Sounds like a life-threatening emergency to the triager    Negative: [1] Had lab test confirmed COVID-19 infection within last 3 months AND [2] new-onset of COVID-19 possible symptoms AND [3] no NEW variant strains in community    Negative: [1] Stridor (harsh, raspy sound heard with breathing in) AND [2] confirmed by triager    Negative: Runny nose from nasal allergies    Negative: [1] Headache is isolated symptom (no fever) AND [2] no known COVID-19 close contact    Negative: [1] Vomiting is isolated symptom (no fever) AND [2] no known COVID-19 close contact    Negative: [1] Diarrhea is isolated symptom (no fever) AND [2] no known COVID-19 close contact    Negative: [1] COVID-19 exposure AND [2] NO symptoms    Negative: [1] COVID-19 vaccine general reaction (fever, headache, muscle aches, fatigue) AND [2] starts within 48 hours of shot (Note: vaccine does not cause respiratory symptoms. Stay here for those symptoms.)    Negative: COVID-19 vaccine, questions about    Negative: [1] Diagnosed with influenza within the last 2 weeks by a HCP AND [2] follow-up call     Negative: [1] Household exposure to known influenza (flu test positive) AND [2] child with influenza-like symptoms    Negative: [1] Difficulty breathing confirmed by triager BUT [2] not severe (Triage tip: Listen to the child's breathing.)    Negative: Ribs are pulling in with each breath (retractions)    Negative: [1] Age < 12 weeks AND [2] fever 100.4 F (38.0 C) or higher rectally    Negative: SEVERE chest pain or pressure (excruciating)    Negative: [1] Oxygen level <92% (<90% if altitude > 5000 feet) AND [2] any trouble breathing    Negative: [1] Stridor (harsh sound with breathing in) AND [2] doesn't respond to 20 minutes of warm mist OR has occurred 2 or more times    Negative: Rapid breathing (Breaths/min > 60 if < 2 mo; > 50 if 2-12 mo; > 40 if 1-5 years; > 30 if 6-11 years; > 20 if > 12 years)    Negative: [1] MODERATE chest pain or pressure (by caller's report) AND [2] can't take a deep breath    Negative: [1] Fever AND [2] > 105 F (40.6 C) by any route OR axillary > 104 F (40 C)    Negative: [1] Shaking chills (shivering) AND [2] present constantly > 30 minutes    Negative: [1] Sore throat AND [2] complication suspected (refuses to drink, can't swallow fluids, new-onset drooling, can't move neck normally or other serious symptom)    Negative: [1] Muscle or body pains AND [2] complication suspected (can't stand, can't walk, can barely walk, can't move arm or hand normally or other serious symptom)    Negative: [1] Headache AND [2] complication suspected (stiff neck, incapacitated by pain, worst headache ever, confused, weakness or other serious symptom)    Negative: [1] Dehydration suspected AND [2] age < 1 year (signs: no urine > 8 hours AND very dry mouth, no  tears, ill-appearing, etc.)    Negative: [1] Dehydration suspected AND [2] age > 1 year (signs: no urine > 12 hours AND very dry mouth, no tears, ill-appearing, etc.)    Negative: Child sounds very sick or weak to the triager    Negative: [1]  Wheezing confirmed by triager AND [2] no trouble breathing (Exception: known asthmatic)    Negative: [1] Lips or face have turned bluish BUT [2] only during coughing fits    Negative: [1] Age < 3 months AND [2] lots of coughing    Negative: [1] Crying continuously AND [2] cannot be comforted AND [3] present > 2 hours    Negative: [1] Oxygen level <92% (90% if altitude > 5000 feet) AND [2] no trouble breathing    Negative: [1] SEVERE RISK patient (e.g., immuno-compromised, serious lung disease, on oxygen, heart disease, bedridden, etc) AND [2] suspected COVID-19 with mild symptoms (Exception: Already seen by PCP and no new or worsening symptoms.)    Negative: [1] Age less than 12 weeks AND [2] suspected COVID-19 with mild symptoms    Negative: Multisystem Inflammatory Syndrome (MIS-C) suspected (Fever AND 2 or more of the following:  widespread red rash, red eyes, red lips, red palms/soles, swollen hands/feet, abdominal pain, vomiting, diarrhea)    Negative: [1] Stridor (harsh sound with breathing in) occurred once BUT [2] not present now    Negative: [1] Continuous coughing keeps from playing or sleeping AND [2] no improvement using cough treatment per guideline    Negative: Earache or ear discharge also present    Negative: Strep throat infection suspected by triager    Negative: [1] Age 6 - 24 months AND [2] fever present > 24 hours AND [3] without other symptoms (no cold, diarrhea, etc.) AND [4] fever > 102 F (39 C) by any route OR axillary > 101 F (38.3 C)    Negative: [1] Fever returns after gone for over 24 hours AND [2] symptoms worse or not improved    Negative: Fever present > 3 days (72 hours)    Negative: [1] Age > 5 years AND [2] sinus pain around cheekbone or eye (not just congestion) AND [3] fever    Negative: [1] Influenza also widespread in the community AND [2] mild flu-like symptoms WITH FEVER AND [3] HIGH-RISK patient for complications with Flu  (See that CDC List)    Negative: [1] COVID-19 rapid  "test result was negative AND [2] mild symptoms (cough, fever, or others) continue    Negative: [1] Age 12 and above AND [2] COVID-19 lab test positive AND [3] HIGH-RISK patient for complications with COVID-19  (See that CDC List)    Negative: [1] COVID-19 diagnosed by positive rapid or PCR lab test AND [2] NO symptoms    Negative: [1] COVID-19 diagnosed by positive rapid or PCR lab test AND [2] mild symptoms (cough, fever or others) AND [3] no complications or SOB    Negative: [1] COVID-19 suspected by a doctor (or NP/PA) AND [2] lab test pending or not done AND [3] mild symptoms (cough, fever or others) AND [4] no complications or SOB    Negative: [1] COVID-19 infection suspected by triager AND [2] lab test not yet done or not available AND [3] mild symptoms (cough, fever, or others) AND [4] no complications or SOB    Negative: COVID-19 Home Isolation and Quarantine, questions about    Negative: COVID-19 Prevention, questions about    Answer Assessment - Initial Assessment Questions  1. COVID-19 DIAGNOSIS: \"Who made your COVID-19 diagnosis? Was it confirmed by a positive lab test?\"       No diagnosis  2. COVID-19 EXPOSURE: \"Was there any known exposure to COVID-19 before the symptoms began?\" Household exposure or close contact with positive COVID-19 patient outside the home (, school, work, play or sports).  CDC Definition of close contact: within 6 feet (2 meters) for a total of 15 minutes or more over a 24-hour period.       no  3. ONSET: \"When did the COVID-19 symptoms start?\"       Last night  4. WORST SYMPTOM: \"What is your child's worst symptom?\"       headache  5. COUGH: \"Does your child have a cough?\" If so, ask, \"How bad is the cough?\"        Yes slight  6. RESPIRATORY DISTRESS: \"Describe your child's breathing. What does it sound like?\" (e.g., wheezing, stridor, grunting, weak cry, unable to speak, retractions, rapid rate, cyanosis)      No difficulty breathing  7. BETTER-SAME-WORSE: \"Is your " "child getting better, staying the same or getting worse compared to yesterday?\"  If getting worse, ask, \"In what way?\"      worse  8. FEVER: \"Does your child have a fever?\" If so, ask: \"What is it, how was it measured, and how long has it been present?\"       no  9. OTHER SYMPTOMS: \"Does your child have any other symptoms?\" (e.g., chills or shaking, sore throat, muscle pains, headache, loss of smell)       Sore throat, headache,muscle pain, fatigue  10. CHILD'S APPEARANCE: \"How sick is your child acting?\" \" What is he doing right now?\" If asleep, ask: \"How was he acting before he went to sleep?\"          Normal self  11. HIGHER RISK for COMPLICATIONS with FLU or COVID-19 : \"Does your child have any chronic medical problems?\" (e.g., heart or lung disease, diabetes, asthma, cancer, weak immune system, etc. See that List in Background Information.  Reason: may need antiviral if has positive test for influenza.)         no  12. VACCINES:  \"Is your child vaccinated against COVID-19?\" If so,\"What vaccine (Pfizer, Moderna, [employer] and [employer]) did they receive?\" \"Have they received a booster shot?\"  Fully Vaccinated definition (CDC):   Person has completed primary vaccine series and received a booster shot OR has completed primary vaccine series within the last 5 months and not yet eligible for booster shot.   Other people are either unvaccinated or partially vaccinated.         no      Note to Triager - Respiratory Distress: Always rule out respiratory distress (also known as working hard to breathe or shortness of breath). Listen for grunting, stridor, wheezing, tachypnea in these calls. How to assess: Listen to the child's breathing early in your assessment. Reason: What you hear is often more valid than the caller's answers to your triage questions.    Protocols used: CORONAVIRUS (COVID-19) DIAGNOSED OR [id number]      "

## 2023-06-21 ENCOUNTER — OFFICE VISIT (OUTPATIENT)
Dept: INTERNAL MEDICINE | Facility: OTHER | Age: 63
End: 2023-06-21
Attending: INTERNAL MEDICINE
Payer: COMMERCIAL

## 2023-06-21 VITALS
DIASTOLIC BLOOD PRESSURE: 80 MMHG | OXYGEN SATURATION: 97 % | WEIGHT: 116 LBS | TEMPERATURE: 98 F | HEIGHT: 61 IN | SYSTOLIC BLOOD PRESSURE: 134 MMHG | BODY MASS INDEX: 21.9 KG/M2 | RESPIRATION RATE: 20 BRPM | HEART RATE: 79 BPM

## 2023-06-21 DIAGNOSIS — R26.89 BALANCE PROBLEM: ICD-10-CM

## 2023-06-21 DIAGNOSIS — F43.23 ADJUSTMENT DISORDER WITH MIXED ANXIETY AND DEPRESSED MOOD: ICD-10-CM

## 2023-06-21 DIAGNOSIS — H53.8 BLURRED VISION: ICD-10-CM

## 2023-06-21 DIAGNOSIS — R22.42 SKIN LUMP OF LEG, LEFT: Primary | ICD-10-CM

## 2023-06-21 DIAGNOSIS — R41.3 MEMORY CHANGE: ICD-10-CM

## 2023-06-21 LAB
ALBUMIN SERPL BCG-MCNC: 4.1 G/DL (ref 3.5–5.2)
ALP SERPL-CCNC: 85 U/L (ref 35–104)
ALT SERPL W P-5'-P-CCNC: 18 U/L (ref 0–50)
ANION GAP SERPL CALCULATED.3IONS-SCNC: 11 MMOL/L (ref 7–15)
AST SERPL W P-5'-P-CCNC: 18 U/L (ref 0–45)
BILIRUB SERPL-MCNC: 0.8 MG/DL
BUN SERPL-MCNC: 20.7 MG/DL (ref 8–23)
CALCIUM SERPL-MCNC: 9.5 MG/DL (ref 8.8–10.2)
CHLORIDE SERPL-SCNC: 104 MMOL/L (ref 98–107)
CHOLEST SERPL-MCNC: 162 MG/DL
CREAT SERPL-MCNC: 1.13 MG/DL (ref 0.51–0.95)
DEPRECATED HCO3 PLAS-SCNC: 28 MMOL/L (ref 22–29)
ERYTHROCYTE [DISTWIDTH] IN BLOOD BY AUTOMATED COUNT: 12.3 % (ref 10–15)
GFR SERPL CREATININE-BSD FRML MDRD: 55 ML/MIN/1.73M2
GLUCOSE SERPL-MCNC: 85 MG/DL (ref 70–99)
HCT VFR BLD AUTO: 45.5 % (ref 35–47)
HDLC SERPL-MCNC: 46 MG/DL
HGB BLD-MCNC: 15.4 G/DL (ref 11.7–15.7)
HOLD SPECIMEN: NORMAL
LDLC SERPL CALC-MCNC: 91 MG/DL
MAGNESIUM SERPL-MCNC: 2.1 MG/DL (ref 1.7–2.3)
MCH RBC QN AUTO: 29.2 PG (ref 26.5–33)
MCHC RBC AUTO-ENTMCNC: 33.8 G/DL (ref 31.5–36.5)
MCV RBC AUTO: 86 FL (ref 78–100)
NONHDLC SERPL-MCNC: 116 MG/DL
PLATELET # BLD AUTO: 273 10E3/UL (ref 150–450)
POTASSIUM SERPL-SCNC: 4 MMOL/L (ref 3.4–5.3)
PROT SERPL-MCNC: 6.9 G/DL (ref 6.4–8.3)
RBC # BLD AUTO: 5.27 10E6/UL (ref 3.8–5.2)
SODIUM SERPL-SCNC: 143 MMOL/L (ref 136–145)
TRIGL SERPL-MCNC: 124 MG/DL
TSH SERPL DL<=0.005 MIU/L-ACNC: 1.02 UIU/ML (ref 0.3–4.2)
VIT B12 SERPL-MCNC: 671 PG/ML (ref 232–1245)
WBC # BLD AUTO: 6.7 10E3/UL (ref 4–11)

## 2023-06-21 PROCEDURE — 99214 OFFICE O/P EST MOD 30 MIN: CPT | Performed by: INTERNAL MEDICINE

## 2023-06-21 PROCEDURE — 84443 ASSAY THYROID STIM HORMONE: CPT | Mod: ZL | Performed by: INTERNAL MEDICINE

## 2023-06-21 PROCEDURE — 36415 COLL VENOUS BLD VENIPUNCTURE: CPT | Mod: ZL | Performed by: INTERNAL MEDICINE

## 2023-06-21 PROCEDURE — 80061 LIPID PANEL: CPT | Mod: ZL | Performed by: INTERNAL MEDICINE

## 2023-06-21 PROCEDURE — G0463 HOSPITAL OUTPT CLINIC VISIT: HCPCS

## 2023-06-21 PROCEDURE — 80053 COMPREHEN METABOLIC PANEL: CPT | Mod: ZL | Performed by: INTERNAL MEDICINE

## 2023-06-21 PROCEDURE — 83735 ASSAY OF MAGNESIUM: CPT | Mod: ZL | Performed by: INTERNAL MEDICINE

## 2023-06-21 PROCEDURE — 82607 VITAMIN B-12: CPT | Mod: ZL | Performed by: INTERNAL MEDICINE

## 2023-06-21 PROCEDURE — 85027 COMPLETE CBC AUTOMATED: CPT | Mod: ZL | Performed by: INTERNAL MEDICINE

## 2023-06-21 ASSESSMENT — PATIENT HEALTH QUESTIONNAIRE - PHQ9
SUM OF ALL RESPONSES TO PHQ QUESTIONS 1-9: 12
SUM OF ALL RESPONSES TO PHQ QUESTIONS 1-9: 12
10. IF YOU CHECKED OFF ANY PROBLEMS, HOW DIFFICULT HAVE THESE PROBLEMS MADE IT FOR YOU TO DO YOUR WORK, TAKE CARE OF THINGS AT HOME, OR GET ALONG WITH OTHER PEOPLE: SOMEWHAT DIFFICULT

## 2023-06-21 ASSESSMENT — PAIN SCALES - GENERAL: PAINLEVEL: SEVERE PAIN (6)

## 2023-06-21 NOTE — PROGRESS NOTES
Assessment & Plan     Skin lump of leg, left  Exact etiology is unknown however it does appear to be cystic.  Will obtain x-ray at this time and if need to consider ultrasound.  - XR Tibia and Fibula Left 2 Views; Future    Balance problem  -Labs today are obtained and overall look good other than a decrease in renal function.  At this time would recommend taking an antihistamine daily, increasing water intake and monitoring symptoms.  If no improvement over the next 2 to 4 weeks would recommend imaging.  - Comprehensive metabolic panel  - CBC with platelets  - Lipid Profile  - Magnesium  - TSH with free T4 reflex    3. Blurred vision  Exact etiology is unknown.  Encouraged to use drops.  Will await lab results and consider imaging.  Encouraged to set up an eye exam    4. Memory change  We will check lab levels.  If she has continued issues would recommend cognitive screening  - Vitamin B12; Future    5. Adjustment disorder with mixed anxiety and depressed mood  - Controlled.  Continue current regimen.  Okay to continue buspirone         Depression Screening Follow Up        6/21/2023     4:23 PM   PHQ   PHQ-9 Total Score 12   Q9: Thoughts of better off dead/self-harm past 2 weeks Not at all         6/21/2023     4:23 PM   Last PHQ-9   1.  Little interest or pleasure in doing things 2   2.  Feeling down, depressed, or hopeless 2   3.  Trouble falling or staying asleep, or sleeping too much 3   4.  Feeling tired or having little energy 2   5.  Poor appetite or overeating 0   6.  Feeling bad about yourself 1   7.  Trouble concentrating 1   8.  Moving slowly or restless 1   Q9: Thoughts of better off dead/self-harm past 2 weeks 0   PHQ-9 Total Score 12       Follow Up Actions Taken  Patient counseled, no additional follow up at this time.     Follow-up as needed and in January for annual physical    [facility]    Diomedes   Bibiana is a 62 year old, presenting for the  following health issues:  Dizziness (Last couple weeks having dizziness and balance issues   Yany Hess, LPN on 6/21/2023 at 4:27 PM//)    History of Present Illness       Reason for visit:  Vertigo    She eats 2-3 servings of fruits and vegetables daily.She consumes 0 sweetened beverage(s) daily.She exercises with enough effort to increase her heart rate 30 to 60 minutes per day.  She exercises with enough effort to increase her heart rate 5 days per week.   She is taking medications regularly.    Today's PHQ-9         PHQ-9 Total Score: 12    PHQ-9 Q9 Thoughts of better off dead/self-harm past 2 weeks :   Not at all    How difficult have these problems made it for you to do your work, take care of things at home, or get along with other people: Somewhat difficult     Patient has been having issues with dizziness.  She has felt off balance overall.  She has not fallen down the stairs twice.  She also feels like she has had worsening memory.  She feels this has been going on over the last couple weeks.  She denies any tremors, weakness, numbness, tingling or other concerning symptoms.  She has had some worsening headache primarily in the right temple.  She has not had any significant allergy symptoms recently.    She does have a lump on the anterior aspect of her left leg that she would like looked at.  It has been there for a long time but she does believe it is getting bigger.    She does have chronic ENT issues with fluid in the right ear.  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    She reports that her balance overall is worse with walking.  She did stop her buspirone and hydroxyzine to see if this helped and did not notice any significant improvement.  Her blood pressure has been adequate overall.    Dizziness  Onset/Duration: few weeks ago  Description:   Do you feel faint: No  Does it feel like the surroundings (bed, room) are moving: No  Unsteady/off balance: YES  Have you passed out or fallen: YES  Intensity:  "moderate  Progression of Symptoms: same  Accompanying Signs & Symptoms:  Heart palpitations or chest pain: No  Nausea, vomiting: No  Weakness or lack of coordination in arms or legs: YES  Vision or speech changes: YES  Numbness or tingling: No  Ringing in ears (Tinnitus): No  Hearing Loss: No  History:   Head trauma/concussion history: No  Previous similar symptoms: No  Recent bleeding history: No  Any new medications (BP?): No  Precipitating factors:   Worse with activity: No  Worse with head movement: N/A  Alleviating factors:   Does staying in a fixed position give relief: no   Therapies tried and outcome:     Chief Complaint   Patient presents with     Dizziness     Last couple weeks having dizziness and balance issues   Yany Hess LPN on 6/21/2023 at 4:27 PM           Initial /80 (BP Location: Right arm, Patient Position: Sitting, Cuff Size: Adult Regular)   Pulse 79   Temp 98  F (36.7  C)   Resp 20   Ht 1.549 m (5' 1\")   Wt 52.6 kg (116 lb)   SpO2 97%   BMI 21.92 kg/m   Estimated body mass index is 21.92 kg/m  as calculated from the following:    Height as of this encounter: 1.549 m (5' 1\").    Weight as of this encounter: 52.6 kg (116 lb).  Medication Reconciliation: complete    FOOD SECURITY SCREENING QUESTIONS  Hunger Vital Signs:  Within the past 12 months we worried whether our food would run out before we got money to buy more. Never  Within the past 12 months the food we bought just didn't last and we didn't have money to get more. Never  Yany Hess LPN 6/21/2023 4:35 PM       Review of Systems   She denies any fevers, chills, night sweats, changes in bowel or bladder or blood in the stool.  She has not had any hematuria or vaginal bleeding.      Objective    /80 (BP Location: Right arm, Patient Position: Sitting, Cuff Size: Adult Regular)   Pulse 79   Temp 98  F (36.7  C)   Resp 20   Ht 1.549 m (5' 1\")   Wt 52.6 kg (116 lb)   SpO2 97%   BMI 21.92 kg/m    Body " mass index is 21.92 kg/m .  Physical Exam   GEN: Vitals reviewed. Healthy appearing. Patient is in no acute distress. Cooperative with exam.  HEENT: Normocephalic atraumatic.  Eyes grossly normal to inspection.  No discharge or erythema, or obvious scleral/conjunctival abnormalities.   NECK: Supple; no thyromegaly or masses noted.  No cervical or supraclavicular lymphadenopathy.  CV: Heart regular in rate and rhythm with no murmur.    LUNGS: No audible wheeze, cough, or visible cyanosis.  No visible retractions or increased work of breathing.  Lungs clear to auscultation bilaterally.    ABD:   nondistended  SKIN: Warm and dry to touch.  Visible skin clear. No significant rash, abnormal pigmentation or lesions.  EXT: No clubbing or cyanosis.  No peripheral edema.  NEURO: Alert and oriented to person, place, and time.    Cranial nerves: Pupils equal, round, reactive to light and accomodation. Visual fields full. Extraocular muscles full in the left eye, right eye with some blurring with lateral gaze. Facial sensation to light touch normal. Facial strength symmetric. Hearing normal. Tongue and palate midline. Power of tongue normal. Shoulder shrug normal and symmetric.  SCM muscle tone normal.   Motor: Tone and strength normal and symmetric in distal and proximal BUE and BLE. No tremor.    Sensory: Normal and symmetric to light touch.  Cerebellar: Rapid alternating movements of hands and heel-tap intact are normal. Normal gait. Romberg negative

## 2023-06-21 NOTE — LETTER
July 2, 2023      Bibianaandrew Dawkinsrey  [address] [place] [address]   [facility] [address]        Dear ,    We are writing to inform you of your test results.    Your lab results overall all look good.  The only significant abnormality is that your kidney function is worse than prior.  This is likely due to dehydration.  I would recommend increasing water intake.  Additionally given the blurred vision that we noted during your visit I would recommend you set up an appointment with an eye doctor.  You should do these things along with taking the allergy medication daily for 2 to 3 weeks and if you do not notice any improvement you should let us know and we will put in an order for an MRI of the head.  Call if you have has any questions.     Additionally I was informed that it looks like the Cologuard kit was sent to your home and you just need to return it.  Please look for this and send it in when you can.     Resulted Orders   Comprehensive metabolic panel   Result Value Ref Range    Sodium 143 136 - 145 mmol/L    Potassium 4.0 3.4 - 5.3 mmol/L    Chloride 104 98 - 107 mmol/L    Carbon Dioxide (CO2) 28 22 - 29 mmol/L    Anion Gap 11 7 - 15 mmol/L    Urea Nitrogen 20.7 8.0 - 23.0 mg/dL    Creatinine 1.13 (H) 0.51 - 0.95 mg/dL    Calcium 9.5 8.8 - 10.2 mg/dL    Glucose 85 70 - 99 mg/dL    Alkaline Phosphatase 85 35 - 104 U/L    AST 18 0 - 45 U/L      Comment:      Reference intervals for this test were updated on 6/12/2023 to more accurately reflect our healthy population. There may be differences in the flagging of prior results with similar values performed with this method. Interpretation of those prior results can be made in the context of the updated reference intervals.    ALT 18 0 - 50 U/L      Comment:      Reference intervals for this test were updated on 6/12/2023 to more accurately reflect our healthy population. There may be differences in the flagging of prior results with similar values performed with this  method. Interpretation of those prior results can be made in the context of the updated reference intervals.      Protein Total 6.9 6.4 - 8.3 g/dL    Albumin 4.1 3.5 - 5.2 g/dL    Bilirubin Total 0.8 <=1.2 mg/dL    GFR Estimate 55 (L) >60 mL/min/1.73m2   CBC with platelets   Result Value Ref Range    WBC Count 6.7 4.0 - 11.0 10e3/uL    RBC Count 5.27 (H) 3.80 - 5.20 10e6/uL    Hemoglobin 15.4 11.7 - 15.7 g/dL    Hematocrit 45.5 35.0 - 47.0 %    MCV 86 78 - 100 fL    MCH 29.2 26.5 - 33.0 pg    MCHC 33.8 31.5 - 36.5 g/dL    RDW 12.3 10.0 - 15.0 %    Platelet Count 273 150 - 450 10e3/uL   Lipid Profile   Result Value Ref Range    Cholesterol 162 <200 mg/dL    Triglycerides 124 <150 mg/dL    Direct Measure HDL 46 (L) >=50 mg/dL    LDL Cholesterol Calculated 91 <=100 mg/dL    Non HDL Cholesterol 116 <130 mg/dL    Narrative    Cholesterol  Desirable:  <200 mg/dL    Triglycerides  Normal:  Less than 150 mg/dL  Borderline High:  150-199 mg/dL  High:  200-499 mg/dL  Very High:  Greater than or equal to 500 mg/dL    Direct Measure HDL  Female:  Greater than or equal to 50 mg/dL   Male:  Greater than or equal to 40 mg/dL    LDL Cholesterol  Desirable:  <100mg/dL  Above Desirable:  100-129 mg/dL   Borderline High:  130-159 mg/dL   High:  160-189 mg/dL   Very High:  >= 190 mg/dL    Non HDL Cholesterol  Desirable:  130 mg/dL  Above Desirable:  130-159 mg/dL  Borderline High:  160-189 mg/dL  High:  190-219 mg/dL  Very High:  Greater than or equal to 220 mg/dL   Magnesium   Result Value Ref Range    Magnesium 2.1 1.7 - 2.3 mg/dL   TSH with free T4 reflex   Result Value Ref Range    TSH 1.02 0.30 - 4.20 uIU/mL   Vitamin B12   Result Value Ref Range    Vitamin B12 671 232 - 1,245 pg/mL       If you have any questions or concerns, please call the clinic at the number listed above.       Sincerely,      Kelly Wilson, DO

## 2023-06-21 NOTE — PATIENT INSTRUCTIONS
Hold Hydroxyzine     For your balance/allergies, please try one of the following:  - Fexofenadine (Allegra 24 hour) 180mg daily for 3-4 weeks and then as needed       Please note that these can take up to 3-4 weeks to see a difference.

## 2023-06-22 NOTE — RESULT ENCOUNTER NOTE
Please let patient know her results have returned.  Patient's lab results overall all look good.  The only significant abnormality is that her kidney function is worse than prior.  This is likely due to dehydration.  I would recommend increasing water intake.  Additionally given the blurred vision that we noted today I would recommend she set up an appointment with an eye doctor.  She should do these things along with taking the allergy medication daily for 2 to 3 weeks and if she does not notice any improvement she is to let us know and we will put in an order for an MRI of the head.  Call if she has any questions.

## 2023-06-26 ENCOUNTER — NURSE TRIAGE (OUTPATIENT)
Dept: INTERNAL MEDICINE | Facility: OTHER | Age: 63
End: 2023-06-26
Payer: COMMERCIAL

## 2023-06-26 NOTE — TELEPHONE ENCOUNTER
Patient called back, they just took their blood pressure medication and want to know how long after they should retake their blood pressure. Educated patient that it can take Amlodipine up to 8 hours to work, and Carvedilol up to 30 minutes to an hour to work. Recommended patient take their blood pressure in the next hour. Asked patient if they would still be presenting to ED per their triage call with the nurse and they verbalized they would still be going.    Hui Morrow RN on 6/26/2023 at 11:20 AM
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
## 2023-07-24 ENCOUNTER — TELEPHONE (OUTPATIENT)
Dept: INTERNAL MEDICINE | Facility: OTHER | Age: 63
End: 2023-07-24
Payer: COMMERCIAL

## 2023-07-24 NOTE — TELEPHONE ENCOUNTER
Patient states she had seen Dr. Wilson for balance issues. She thought she had ordered an MRI and other tests as she is still having issues. Please call.    Almita Meraz on 7/24/2023 at 9:12 AM

## 2023-07-24 NOTE — TELEPHONE ENCOUNTER
Left message for patient to return call.  Per Dr. Wilson   Please let patient know her results have returned.  Patient's lab results overall all look good.  The only significant abnormality is that her kidney function is worse than prior.  This is likely due to dehydration.  I would recommend increasing water intake.  Additionally given the blurred vision that we noted today I would recommend she set up an appointment with an eye doctor.  She should do these things along with taking the allergy medication daily for 2 to 3 weeks and if she does not notice any improvement she is to let us know and we will put in an order for an MRI of the head.  Call if she has any questions.      Prema Dunham LPN on 7/24/2023 at 2:35 PM

## 2023-07-25 NOTE — TELEPHONE ENCOUNTER
Writer attempted to call patient with no working phone in service. Writer will close encounter.  Prema Dunham LPN on 7/25/2023 at 12:13 PM

## 2023-08-15 ENCOUNTER — TELEPHONE (OUTPATIENT)
Dept: INTERNAL MEDICINE | Facility: OTHER | Age: 63
End: 2023-08-15
Payer: COMMERCIAL

## 2023-08-15 DIAGNOSIS — R26.89 BALANCE PROBLEM: Primary | ICD-10-CM

## 2023-08-15 DIAGNOSIS — R41.3 MEMORY CHANGE: ICD-10-CM

## 2023-08-15 DIAGNOSIS — H53.8 BLURRED VISION: ICD-10-CM

## 2023-08-15 NOTE — TELEPHONE ENCOUNTER
"Dr. Wilson,   Per 6/21/23 note, \"if she does not notice any improvement she is to let us know and we will put in an order for an MRI of the head.\" Unsure of which order is indicated for this; unable to pend.   Zoe Napier RN on 8/15/2023 at 4:22 PM      Message left for patient to return call. There is an order for XR left tip and fib and MA screening bilateral for 8/18/23.   "

## 2023-08-15 NOTE — TELEPHONE ENCOUNTER
Patient is inquiring on orders that were to be put in for xray and mri   Also she wants Dr. Wilson to know that she is still having issues with dizziness and headaches    Nick Raya on 8/15/2023 at 1:25 PM

## 2023-08-16 NOTE — TELEPHONE ENCOUNTER
Message left for patient to return call to the Unit [address] nurse. Zoe Napier RN on 8/16/2023 at 2:13 PM

## 2023-09-27 ENCOUNTER — HOSPITAL ENCOUNTER (OUTPATIENT)
Dept: GENERAL RADIOLOGY | Facility: OTHER | Age: 63
Discharge: HOME | End: 2023-09-27
Attending: INTERNAL MEDICINE
Payer: COMMERCIAL

## 2023-09-27 ENCOUNTER — HOSPITAL ENCOUNTER (OUTPATIENT)
Dept: MAMMOGRAPHY | Facility: OTHER | Age: 63
Discharge: HOME | End: 2023-09-27
Attending: INTERNAL MEDICINE
Payer: COMMERCIAL

## 2023-09-27 ENCOUNTER — OFFICE VISIT (OUTPATIENT)
Dept: INTERNAL MEDICINE | Facility: OTHER | Age: 63
End: 2023-09-27
Attending: INTERNAL MEDICINE
Payer: COMMERCIAL

## 2023-09-27 VITALS
SYSTOLIC BLOOD PRESSURE: 136 MMHG | OXYGEN SATURATION: 99 % | HEART RATE: 79 BPM | HEIGHT: 61 IN | BODY MASS INDEX: 22.58 KG/M2 | WEIGHT: 119.6 LBS | DIASTOLIC BLOOD PRESSURE: 82 MMHG | RESPIRATION RATE: 16 BRPM | TEMPERATURE: 97.2 F

## 2023-09-27 DIAGNOSIS — H53.8 BLURRED VISION: ICD-10-CM

## 2023-09-27 DIAGNOSIS — R22.42 SKIN LUMP OF LEG, LEFT: ICD-10-CM

## 2023-09-27 DIAGNOSIS — R26.89 BALANCE PROBLEM: Primary | ICD-10-CM

## 2023-09-27 DIAGNOSIS — R41.3 MEMORY CHANGE: ICD-10-CM

## 2023-09-27 DIAGNOSIS — R51.9 NONINTRACTABLE HEADACHE, UNSPECIFIED CHRONICITY PATTERN, UNSPECIFIED HEADACHE TYPE: ICD-10-CM

## 2023-09-27 DIAGNOSIS — Z86.73 HISTORY OF TIA (TRANSIENT ISCHEMIC ATTACK) AND STROKE: ICD-10-CM

## 2023-09-27 DIAGNOSIS — Z12.31 VISIT FOR SCREENING MAMMOGRAM: ICD-10-CM

## 2023-09-27 DIAGNOSIS — R94.4 DECREASED GFR: ICD-10-CM

## 2023-09-27 LAB
ALBUMIN SERPL BCG-MCNC: 4.4 G/DL (ref 3.5–5.2)
ANION GAP SERPL CALCULATED.3IONS-SCNC: 12 MMOL/L (ref 7–15)
BASOPHILS # BLD AUTO: 0 10E3/UL (ref 0–0.2)
BASOPHILS NFR BLD AUTO: 0 %
BUN SERPL-MCNC: 18.9 MG/DL (ref 8–23)
CALCIUM SERPL-MCNC: 10 MG/DL (ref 8.8–10.2)
CHLORIDE SERPL-SCNC: 104 MMOL/L (ref 98–107)
CREAT SERPL-MCNC: 0.85 MG/DL (ref 0.51–0.95)
DEPRECATED HCO3 PLAS-SCNC: 26 MMOL/L (ref 22–29)
EGFRCR SERPLBLD CKD-EPI 2021: 77 ML/MIN/1.73M2
EOSINOPHIL # BLD AUTO: 0.1 10E3/UL (ref 0–0.7)
EOSINOPHIL NFR BLD AUTO: 2 %
ERYTHROCYTE [DISTWIDTH] IN BLOOD BY AUTOMATED COUNT: 12.7 % (ref 10–15)
GLUCOSE SERPL-MCNC: 124 MG/DL (ref 70–99)
HCT VFR BLD AUTO: 46.2 % (ref 35–47)
HGB BLD-MCNC: 15.8 G/DL (ref 11.7–15.7)
IMM GRANULOCYTES # BLD: 0 10E3/UL
IMM GRANULOCYTES NFR BLD: 0 %
LYMPHOCYTES # BLD AUTO: 2.3 10E3/UL (ref 0.8–5.3)
LYMPHOCYTES NFR BLD AUTO: 33 %
MCH RBC QN AUTO: 30 PG (ref 26.5–33)
MCHC RBC AUTO-ENTMCNC: 34.2 G/DL (ref 31.5–36.5)
MCV RBC AUTO: 88 FL (ref 78–100)
MONOCYTES # BLD AUTO: 0.4 10E3/UL (ref 0–1.3)
MONOCYTES NFR BLD AUTO: 6 %
NEUTROPHILS # BLD AUTO: 4 10E3/UL (ref 1.6–8.3)
NEUTROPHILS NFR BLD AUTO: 59 %
NRBC # BLD AUTO: 0 10E3/UL
NRBC BLD AUTO-RTO: 0 /100
PHOSPHATE SERPL-MCNC: 3.8 MG/DL (ref 2.5–4.5)
PLATELET # BLD AUTO: 284 10E3/UL (ref 150–450)
POTASSIUM SERPL-SCNC: 4.1 MMOL/L (ref 3.4–5.3)
RBC # BLD AUTO: 5.27 10E6/UL (ref 3.8–5.2)
SODIUM SERPL-SCNC: 142 MMOL/L (ref 135–145)
WBC # BLD AUTO: 6.8 10E3/UL (ref 4–11)

## 2023-09-27 PROCEDURE — 36415 COLL VENOUS BLD VENIPUNCTURE: CPT | Mod: ZL | Performed by: INTERNAL MEDICINE

## 2023-09-27 PROCEDURE — 80069 RENAL FUNCTION PANEL: CPT | Mod: ZL | Performed by: INTERNAL MEDICINE

## 2023-09-27 PROCEDURE — 85004 AUTOMATED DIFF WBC COUNT: CPT | Mod: ZL | Performed by: INTERNAL MEDICINE

## 2023-09-27 PROCEDURE — 77067 SCR MAMMO BI INCL CAD: CPT

## 2023-09-27 PROCEDURE — 99214 OFFICE O/P EST MOD 30 MIN: CPT | Performed by: INTERNAL MEDICINE

## 2023-09-27 PROCEDURE — G0463 HOSPITAL OUTPT CLINIC VISIT: HCPCS | Mod: 25

## 2023-09-27 PROCEDURE — G0463 HOSPITAL OUTPT CLINIC VISIT: HCPCS

## 2023-09-27 PROCEDURE — 73590 X-RAY EXAM OF LOWER LEG: CPT | Mod: LT

## 2023-09-27 ASSESSMENT — ANXIETY QUESTIONNAIRES
2. NOT BEING ABLE TO STOP OR CONTROL WORRYING: MORE THAN HALF THE DAYS
3. WORRYING TOO MUCH ABOUT DIFFERENT THINGS: MORE THAN HALF THE DAYS
GAD7 TOTAL SCORE: 9
GAD7 TOTAL SCORE: 9
1. FEELING NERVOUS, ANXIOUS, OR ON EDGE: SEVERAL DAYS
6. BECOMING EASILY ANNOYED OR IRRITABLE: SEVERAL DAYS
7. FEELING AFRAID AS IF SOMETHING AWFUL MIGHT HAPPEN: SEVERAL DAYS
4. TROUBLE RELAXING: SEVERAL DAYS
IF YOU CHECKED OFF ANY PROBLEMS ON THIS QUESTIONNAIRE, HOW DIFFICULT HAVE THESE PROBLEMS MADE IT FOR YOU TO DO YOUR WORK, TAKE CARE OF THINGS AT HOME, OR GET ALONG WITH OTHER PEOPLE: NOT DIFFICULT AT ALL
5. BEING SO RESTLESS THAT IT IS HARD TO SIT STILL: SEVERAL DAYS

## 2023-09-27 ASSESSMENT — PAIN SCALES - GENERAL: PAINLEVEL: EXTREME PAIN (8)

## 2023-09-27 NOTE — NURSING NOTE
"Chief Complaint   Patient presents with    RECHECK     Headaches and balance issues      Patient presents to the clinic today for headaches and balance issues    Initial There were no vitals taken for this visit. Estimated body mass index is 21.92 kg/m  as calculated from the following:    Height as of 6/21/23: 1.549 m (5' 1\").    Weight as of 6/21/23: 52.6 kg (116 lb).  Meds Reconciled: complete        FOOD SECURITY SCREENING QUESTIONS:    The next two questions are to help us understand your food security.  If you are feeling you need any assistance in this area, we have resources available to support you today.    Hunger Vital Signs:  Within the past 12 months we worried whether our food would run out before we got money to buy more. Never  Within the past 12 months the food we bought just didn't last and we didn't have money to get more. Never  Prema Dunham LPN,LPN on 9/27/2023 at 11:46 AM      Prema Dunham LPN  "

## 2023-09-27 NOTE — PROGRESS NOTES
Assessment & Plan     Balance problem  Given her ongoing issues with balance, vision, headaches and memory issues we will pursue imaging along with repeat lab testing.  - MR Brain w/o & w Contrast; Future  - CBC and Differential    Blurred vision  - MR Brain w/o & w Contrast; Future  - CBC and Differential    History of TIA (transient ischemic attack) and stroke  - MR Brain w/o & w Contrast; Future  - CBC and Differential    Nonintractable headache, unspecified chronicity pattern, unspecified headache type  - MR Brain w/o & w Contrast; Future  - CBC and Differential    Memory change  Additionally she will be referred for cognitive testing to better quantify her memory loss.  - Occupational Therapy Referral; Future  - CBC and Differential    Decreased GFR  -Recheck renal function to be sure it has not worsened  - Renal Function Panel  - CBC and Differential      Return in about 6 weeks (around 11/8/2023) for Recheck, after testing.    Kelly Wilson, [facility]    Diomedes Mccarty is a 63 year old, presenting for the following health issues:  RECHECK (Headaches and balance issues /)        9/27/2023    11:46 AM   Additional Questions   Roomed by Prema GRIJALVA       History of Present Illness       Mental Health Follow-up:  Patient presents to follow-up on Depression & Anxiety.Patient's depression since last visit has been:  Medium  The patient is not having other symptoms associated with depression.  Patient's anxiety since last visit has been:  Medium  The patient is not having other symptoms associated with anxiety.  Any significant life events: No  Patient is feeling anxious or having panic attacks.  Patient has no concerns about alcohol or drug use.    Headaches:   Since the patient's last clinic visit, headaches are: worsened  The patient is getting headaches:  Every day  She is not able to do normal daily activities when she has a migraine.  The patient is taking the following  "rescue/relief medications:  Naproxyn (Aleve)   Patient states \"I get no relief\" from the rescue/relief medications.   The patient is taking the following medications to prevent migraines:  Other  In the past 4 weeks, the patient has gone to an Urgent Care or Emergency Room 0 times times due to headaches.    She eats 2-3 servings of fruits and vegetables daily.She consumes 2 sweetened beverage(s) daily.She exercises with enough effort to increase her heart rate 30 to 60 minutes per day.  She exercises with enough effort to increase her heart rate 7 days per week.   She is taking medications regularly.     She also has had some memory changes with the above headache.  She does have a history of TIA.  She reports some balance issues and blurred vision.    She was noticed with the last labs to have a decrease in her GFR.    Review of Systems   No fever      Objective    /82 (BP Location: Right arm, Patient Position: Sitting, Cuff Size: Adult Regular)   Pulse 79   Temp 97.2  F (36.2  C) (Temporal)   Resp 16   Ht 1.556 m (5' 1.25\")   Wt 54.3 kg (119 lb 9.6 oz)   SpO2 99%   BMI 22.41 kg/m    Body mass index is 22.41 kg/m .  Physical Exam   GEN: Vitals reviewed. Healthy appearing. Patient is in no acute distress. Cooperative with exam.  HEENT: Normocephalic atraumatic.  Eyes grossly normal to inspection.  No discharge or erythema, or obvious scleral/conjunctival abnormalities.   LUNGS: No audible wheeze, cough, or visible cyanosis.  No visible retractions or increased work of breathing.    ABD:   nondistended  SKIN: Warm and dry to touch.  Visible skin clear. No significant rash, abnormal pigmentation or lesions.    " Oral Minoxidil Pregnancy And Lactation Text: This medication should only be used when clearly needed if you are pregnant, attempting to become pregnant or breast feeding.

## 2023-09-27 NOTE — LETTER
October 3, 2023      Bibiana Stewart  [address] [place] [address]   [facility] [id number]        Dear ,    We are writing to inform you of your test results.    Your test results fall within the expected range(s) or remain unchanged from previous results.  Kidney function is slightly better.  X-ray does not show any obvious abnormality where the lump is.  Continue to monitor.  Please continue with current treatment plan and I will watch for the results from imaging.    Resulted Orders   Renal Function Panel   Result Value Ref Range    Sodium 142 135 - 145 mmol/L      Comment:      Reference intervals for this test were updated on 09/26/2023 to more accurately reflect our healthy population. There may be differences in the flagging of prior results with similar values performed with this method. Interpretation of those prior results can be made in the context of the updated reference intervals.     Potassium 4.1 3.4 - 5.3 mmol/L    Chloride 104 98 - 107 mmol/L    Carbon Dioxide (CO2) 26 22 - 29 mmol/L    Anion Gap 12 7 - 15 mmol/L    Glucose 124 (H) 70 - 99 mg/dL    Urea Nitrogen 18.9 8.0 - 23.0 mg/dL    Creatinine 0.85 0.51 - 0.95 mg/dL    GFR Estimate 77 >60 mL/min/1.73m2    Calcium 10.0 8.8 - 10.2 mg/dL    Albumin 4.4 3.5 - 5.2 g/dL    Phosphorus 3.8 2.5 - 4.5 mg/dL   CBC with platelets and differential   Result Value Ref Range    WBC Count 6.8 4.0 - 11.0 10e3/uL    RBC Count 5.27 (H) 3.80 - 5.20 10e6/uL    Hemoglobin 15.8 (H) 11.7 - 15.7 g/dL    Hematocrit 46.2 35.0 - 47.0 %    MCV 88 78 - 100 fL    MCH 30.0 26.5 - 33.0 pg    MCHC 34.2 31.5 - 36.5 g/dL    RDW 12.7 10.0 - 15.0 %    Platelet Count 284 150 - 450 10e3/uL    % Neutrophils 59 %    % Lymphocytes 33 %    % Monocytes 6 %    % Eosinophils 2 %    % Basophils 0 %    % Immature Granulocytes 0 %    NRBCs per 100 WBC 0 <1 /100    Absolute Neutrophils 4.0 1.6 - 8.3 10e3/uL    Absolute Lymphocytes 2.3 0.8 - 5.3 10e3/uL    Absolute Monocytes 0.4 0.0 - 1.3 10e3/uL     Absolute Eosinophils 0.1 0.0 - 0.7 10e3/uL    Absolute Basophils 0.0 0.0 - 0.2 10e3/uL    Absolute Immature Granulocytes 0.0 <=0.4 10e3/uL    Absolute NRBCs 0.0 10e3/uL       If you have any questions or concerns, please call the clinic at the number listed above.       Sincerely,      Kelly Wilson, DO

## 2023-10-16 ENCOUNTER — TELEPHONE (OUTPATIENT)
Dept: INTERNAL MEDICINE | Facility: OTHER | Age: 63
End: 2023-10-16
Payer: COMMERCIAL

## 2023-10-16 DIAGNOSIS — F40.240 CLAUSTROPHOBIA: Primary | ICD-10-CM

## 2023-10-16 NOTE — TELEPHONE ENCOUNTER
Patient presented to counter stating her MRI was rescheduled today for Saturday 10/28/23 and needs the medication for her Claustrophobia filled for this appointment. Please let patient know if this request can be filled.     Belle [place]ista on 10/16/2023 at 3:10 PM

## 2023-10-17 RX ORDER — LORAZEPAM 0.5 MG/1
TABLET ORAL
Qty: 2 TABLET | Refills: 0 | Status: SHIPPED | OUTPATIENT
Start: 2023-10-17 | End: 2024-03-20

## 2023-10-28 ENCOUNTER — HOSPITAL ENCOUNTER (OUTPATIENT)
Dept: MRI IMAGING | Facility: OTHER | Age: 63
Discharge: HOME | End: 2023-10-28
Attending: INTERNAL MEDICINE | Admitting: INTERNAL MEDICINE
Payer: COMMERCIAL

## 2023-10-28 DIAGNOSIS — R51.9 HEAD ACHE: ICD-10-CM

## 2023-10-28 DIAGNOSIS — H53.8 BLURRED VISION: ICD-10-CM

## 2023-10-28 DIAGNOSIS — Z86.73 HX-TIA (TRANSIENT ISCHEMIC ATTACK): ICD-10-CM

## 2023-10-28 PROCEDURE — 255N000002 HC RX 255 OP 636: Mod: JZ | Performed by: INTERNAL MEDICINE

## 2023-10-28 PROCEDURE — A9575 INJ GADOTERATE MEGLUMI 0.1ML: HCPCS | Mod: JZ | Performed by: INTERNAL MEDICINE

## 2023-10-28 PROCEDURE — 70553 MRI BRAIN STEM W/O & W/DYE: CPT

## 2023-10-28 RX ORDER — GADOTERATE MEGLUMINE 376.9 MG/ML
15 INJECTION INTRAVENOUS ONCE
Status: COMPLETED | OUTPATIENT
Start: 2023-10-28 | End: 2023-10-28

## 2023-10-28 RX ADMIN — GADOTERATE MEGLUMINE 10 ML: 376.9 INJECTION INTRAVENOUS at 13:18

## 2023-11-02 ENCOUNTER — THERAPY VISIT (OUTPATIENT)
Dept: OCCUPATIONAL THERAPY | Facility: OTHER | Age: 63
End: 2023-11-02
Attending: INTERNAL MEDICINE
Payer: COMMERCIAL

## 2023-11-02 DIAGNOSIS — R41.3 MEMORY CHANGE: ICD-10-CM

## 2023-11-02 PROCEDURE — 97166 OT EVAL MOD COMPLEX 45 MIN: CPT | Mod: GO

## 2023-11-02 PROCEDURE — 97129 THER IVNTJ 1ST 15 MIN: CPT | Mod: GO

## 2023-11-02 PROCEDURE — 97130 THER IVNTJ EA ADDL 15 MIN: CPT | Mod: GO

## 2023-11-02 NOTE — PROGRESS NOTES
"OCCUPATIONAL THERAPY EVALUATION  Type of Visit: Evaluation    See electronic medical record for Abuse and Falls Screening details.    Subjective      Presenting condition or subjective complaint:  Patient reports that over the last several months she has noticed a decline in her memory during daily activities. She states her main concerns are that she is now forgetting appointments and phone numbers. She states she used to have a very sharp memory. 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 Patient reports she did have a TIA back in 2018 and she just had an MRI of her brain on Saturday but has not yet received the results. Patient also reports years of sleeping difficulties and depression.   Date of onset:  (\"A couple months ago\")      Prior diagnostic imaging/testing results:     MRI of Brain 10/28/23: No acute infarct, intracranial hemorrhage, or enhancing abnormality   Prior therapy history for the same diagnosis, illness or injury:    None    Prior Level of Function  Patient was fully independent with ADLs, IADLs, and caregiver duties without concerns of forgetting daily items.     Living Environment  Social support:   Patient has her own apartment in [place] in which her grandson stays with her when she is there.  She is currently living in a home in [place] with her grandson (7) (patient is maternal grandmother), his paternal grandfather, and the grandfather's mother-in-law (whom she is acting as primary caregiver for during the day).    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    Patient goals for therapy:  To improve her memory    Pain assessment: Pain denied     Objective   Cognitive Status Examination  Orientation: Oriented to person, place and " time   Level of Consciousness: Alert, Anxious  Follows Commands and Answers Questions: 100% of the time  Personal Safety and Judgement: Intact  Memory: Impaired- Patient completed the [facility] Mental Status exam and scored a 25/30.  She missed points for attention to verbal instructions, delayed recall, and spontaneous information generation in a timed situation.    Attention: Quiet environment required  INSTRUMENTAL ACTIVITIES OF DAILY LIVING (IADL): Patient reports difficulties keeping track of appointments and occasionally remembering to take her own medications.   Meal Planning/Prep: No difficulties identified  Home/Financial Management: No difficulties identified  Community Mobility: Driving- no difficulties identified  Care of Others:- Patient is caregiver for her 7 year old grandson who has a diagnosis of autism and she is now the caregiver of his great grandmother.      Assessment & Plan   CLINICAL IMPRESSIONS  Medical Diagnosis: Memory change    Treatment Diagnosis: Memory Changes    Impression/Assessment: Pt is a 63 year old female presenting to Occupational Therapy due to memory changes.  The following significant findings have been identified: Impaired cognition.  These identified deficits interfere with their ability to perform care of others and managing her own health  as compared to previous level of function.     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  Goal Progress: New Goal  Target Date: 11/30/23  OT Goal 2  Goal Identifier: Sleep Hygiene  Goal Description: Patient will report understanding and implementation of sleep hygiene strategies for improved ability to complete caregiver tasks.  Goal Progress: New Goal  Target Date: 11/30/23      Frequency of Treatment: 1-2x/week  Duration of Treatment: 2-4 weeks     Recommended Referrals to Other Professionals:  Mental Health Therapy  Education Assessment: Learner/Method: Patient  Education Comments: See above     Risks and benefits of evaluation/treatment have been explained.   Patient/Family/caregiver agrees with Plan of Care.     Evaluation Time:    OT Eval, Moderate Complexity Minutes (06942): 30    Signing Clinician: DAIN Moore [facility]                                                                                   OUTPATIENT OCCUPATIONAL THERAPY      PLAN OF TREATMENT FOR OUTPATIENT REHABILITATION   Patient's Last Name, First Name, MALACHI StewartBibiana CHEEMA YOB: 1960   Provider's Name   [facility]   Medical Record No.  [id number]     Onset Date:  (\"A couple months ago\") Start of Care Date:  11/2/2023     Medical Diagnosis:  Memory change      OT Treatment Diagnosis:  Memory Changes Plan of Treatment  Frequency/Duration:1-2x/week/2-4 weeks    Certification date from  11/2/2023   To     11/30/2023     See note for plan of treatment details and functional goals     DAIN Moore                         I CERTIFY THE NEED FOR THESE SERVICES FURNISHED UNDER        THIS PLAN OF TREATMENT AND WHILE UNDER MY CARE     (Physician attestation of this document indicates review and certification of the therapy plan).                Referring Provider:  Kelly Wilson      Initial Assessment  See Epic Evaluation-                          "

## 2023-11-09 ENCOUNTER — OFFICE VISIT (OUTPATIENT)
Dept: INTERNAL MEDICINE | Facility: OTHER | Age: 63
End: 2023-11-09
Attending: INTERNAL MEDICINE
Payer: COMMERCIAL

## 2023-11-09 VITALS
HEIGHT: 61 IN | OXYGEN SATURATION: 99 % | TEMPERATURE: 96.9 F | RESPIRATION RATE: 16 BRPM | BODY MASS INDEX: 21.79 KG/M2 | WEIGHT: 115.4 LBS | SYSTOLIC BLOOD PRESSURE: 134 MMHG | DIASTOLIC BLOOD PRESSURE: 82 MMHG | HEART RATE: 67 BPM

## 2023-11-09 DIAGNOSIS — I65.21 ATHEROSCLEROSIS OF RIGHT CAROTID ARTERY: ICD-10-CM

## 2023-11-09 DIAGNOSIS — Z86.73 HISTORY OF STROKE: ICD-10-CM

## 2023-11-09 DIAGNOSIS — Z13.1 SCREENING FOR DIABETES MELLITUS: ICD-10-CM

## 2023-11-09 DIAGNOSIS — R51.9 NONINTRACTABLE HEADACHE, UNSPECIFIED CHRONICITY PATTERN, UNSPECIFIED HEADACHE TYPE: Primary | ICD-10-CM

## 2023-11-09 DIAGNOSIS — E78.2 MIXED HYPERLIPIDEMIA: ICD-10-CM

## 2023-11-09 PROCEDURE — 99214 OFFICE O/P EST MOD 30 MIN: CPT | Performed by: INTERNAL MEDICINE

## 2023-11-09 PROCEDURE — G0463 HOSPITAL OUTPT CLINIC VISIT: HCPCS | Performed by: INTERNAL MEDICINE

## 2023-11-09 RX ORDER — ATORVASTATIN CALCIUM 10 MG/1
10 TABLET, FILM COATED ORAL EVERY EVENING
Qty: 90 TABLET | Refills: 0 | Status: SHIPPED | OUTPATIENT
Start: 2023-11-09 | End: 2024-03-20

## 2023-11-09 ASSESSMENT — PAIN SCALES - GENERAL: PAINLEVEL: SEVERE PAIN (7)

## 2023-11-09 NOTE — NURSING NOTE
"Chief Complaint   Patient presents with    RECHECK     Testing        Patient presents to the clinic today for a recheck for testing.  Initial There were no vitals taken for this visit. Estimated body mass index is 22.41 kg/m  as calculated from the following:    Height as of 9/27/23: 1.556 m (5' 1.25\").    Weight as of 9/27/23: 54.3 kg (119 lb 9.6 oz).  Meds Reconciled: complete      Prema Dunham LPN,LPN on 11/9/2023 at 8:24 AM  Ext. [phone]        Prema Dunham LPN  "

## 2023-11-09 NOTE — PROGRESS NOTES
"  Assessment & Plan     Nonintractable headache, unspecified chronicity pattern, unspecified headache type  We discussed options and given that she has had long-term headaches we will refer her to neurology for further discussion of medications.  Plan for labs with a lab only visit.  She can continue with medications if they are beneficial however encouraged to stop the Nurtec if it is not helping.  - Adult Neurology  Referral; Future  - Magnesium; Future  - Sedimentation Rate (ESR); Future    History of stroke  Statin started, continue to monitor and will consider an ultrasound of the carotid arteries and follow-up.  - Adult Neurology  Referral; Future  - atorvastatin (LIPITOR) 10 MG tablet; Take 1 tablet (10 mg) by mouth every evening    Atherosclerosis of right carotid artery    Mixed hyperlipidemia  See above  - atorvastatin (LIPITOR) 10 MG tablet; Take 1 tablet (10 mg) by mouth every evening  - Lipid Profile; Future    Screening for diabetes mellitus  - Comprehensive metabolic panel; Future    No follow-ups on file.    Kelly Wilson, [facility]   Bibiana is a 63 year old, presenting for the following health issues:  RECHECK (Testing /)        11/9/2023     8:24 AM   Additional Questions   Roomed by Prema GRIJALVA       History of Present Illness       Headaches:   Since the patient's last clinic visit, headaches are: worsened  The patient is getting headaches:  All the time  She is able to do normal daily activities when she has a migraine.  The patient is taking the following rescue/relief medications:  Naproxyn (Aleve) and Excedrin   Patient states \"The relief is inconsistent\" from the rescue/relief medications.   The patient is taking the following medications to prevent migraines:  Other  In the past 4 weeks, the patient has gone to an Urgent Care or Emergency Room 0 times times due to headaches.    She eats 2-3 servings of fruits and vegetables daily.She " "consumes 2 sweetened beverage(s) daily.She exercises with enough effort to increase her heart rate 30 to 60 minutes per day.  She exercises with enough effort to increase her heart rate 5 days per week.   She is taking medications regularly.     Patient returns after recent testing for headaches, balance issues and vision changes.  She reports that her vision has improved.  Her headaches have persisted.  These are typically bilateral temples and sometimes across the top of the head.  She has continued to use some ibuprofen as needed along with Nurtec.  She has not found that these are overly beneficial.  She previously tried gabapentin and had adverse reactions to this.  She had an MRI done which we reviewed together today.  It did show a prior stroke dating previous to 2018.  No new abnormality was indicated.  She also had some basic labs done at that time of her last visit which were essentially all normal other than borderline blood sugar.    She has a history of hyperlipidemia however is not on a statin.  We discussed this and she is open to starting.    She is due for a Pap smear due to a prior abnormality.  She was HPV positive in 2020.  She is also due for an annual physical and labs.    Review of Systems   No fever      Objective    /82 (BP Location: Right arm, Patient Position: Sitting, Cuff Size: Adult Regular)   Pulse 67   Temp 96.9  F (36.1  C) (Temporal)   Resp 16   Ht 1.549 m (5' 1\")   Wt 52.3 kg (115 lb 6.4 oz)   LMP 05/12/2003 (Approximate)   SpO2 99%   BMI 21.80 kg/m    Body mass index is 21.8 kg/m .  Physical Exam   GEN: Vitals reviewed. Healthy appearing. Patient is in no acute distress. Cooperative with exam.  HEENT: Normocephalic atraumatic.  Eyes grossly normal to inspection.  No discharge or erythema, or obvious scleral/conjunctival abnormalities.   LUNGS: No audible wheeze, cough, or visible cyanosis.  No visible retractions or increased work of breathing.    ABD:  " nondistended  SKIN: Warm and dry to touch.  Visible skin clear. No significant rash, abnormal pigmentation or lesions.

## 2023-11-13 ENCOUNTER — THERAPY VISIT (OUTPATIENT)
Dept: OCCUPATIONAL THERAPY | Facility: OTHER | Age: 63
End: 2023-11-13
Attending: INTERNAL MEDICINE
Payer: COMMERCIAL

## 2023-11-13 DIAGNOSIS — R41.3 MEMORY CHANGE: Primary | ICD-10-CM

## 2023-11-13 PROCEDURE — 97129 THER IVNTJ 1ST 15 MIN: CPT | Mod: GO

## 2023-11-13 PROCEDURE — 97130 THER IVNTJ EA ADDL 15 MIN: CPT | Mod: GO

## 2023-11-13 PROCEDURE — 97535 SELF CARE MNGMENT TRAINING: CPT | Mod: GO

## 2023-11-17 ENCOUNTER — PATIENT OUTREACH (OUTPATIENT)
Dept: CARE COORDINATION | Facility: CLINIC | Age: 63
End: 2023-11-17
Payer: COMMERCIAL

## 2023-11-17 NOTE — PROGRESS NOTES
Clinic Care Coordination Contact  Care Team Conversations    Patient calls concerned that her car is breaking down. 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 Also has to get her grandson to therapy appointments. She is wondering if there is any assistance to get repairs.     Plan: Explained that her address is not in [facility]. Suggested she talk with [facility] worker, and she is not sure that she has one. She will check. She will also get a quote for repairs.   Jennifer Erwin RN on 11/17/2023 at 8:47 AM

## 2023-11-25 NOTE — DISCHARGE INSTRUCTIONS
Bursitis  You have bursitis. This is an inflammation of the bursa. These are small, fluid-filled sacs that surround the larger joints of the body. The bursa help the muscles and tendons move smoothly over the joints.  Bursitis often happens in the shoulder. But it can also affect the elbows, hips, pelvis, knees, toes, and heels. Bursitis can be caused by injury, overuse of the joint, or infection of the bursa. Symptoms include pain and tenderness over a joint. Symptoms get worse with movement.  Bursitis is treated with an anti-inflammatory medicine and by resting the joint. More severe cases require injection of medicine directly into the bursa.    Home care    Rest the painful joint and protect it from movement. This will allow the inflammation to heal faster.    Apply an ice pack over the injured area for no more than 15 to 20 minutes. Do this every 3 to 6 hours for the first 24 to 48 hours. Keep using ice packs 3 to 4 times a day until the pain and swelling improves.     To make an ice pack, put ice cubes in a sealed plastic zip-lock bag. Wrap the bag in a clean, thin towel or cloth. Never put ice or an ice pack directly on the skin.      You may take over-the-counter pain medicine to treat pain and inflammation    As your symptoms improve, slowly begin to move the joint. Do not overuse the joint. This may cause the symptoms to flare up again.           The right radial pulse is 1+.

## 2023-11-30 ENCOUNTER — THERAPY VISIT (OUTPATIENT)
Dept: OCCUPATIONAL THERAPY | Facility: OTHER | Age: 63
End: 2023-11-30
Attending: INTERNAL MEDICINE
Payer: COMMERCIAL

## 2023-11-30 DIAGNOSIS — R41.3 MEMORY CHANGE: Primary | ICD-10-CM

## 2023-11-30 PROCEDURE — 97535 SELF CARE MNGMENT TRAINING: CPT | Mod: GO

## 2023-11-30 NOTE — PROGRESS NOTES
"   11/30/23 0500   Appointment Info   Treating Provider Sunita Woods, OTR/L   Visits Used 4   Medical Diagnosis Memory change   OT Tx Diagnosis Memory Changes   Other pertinent information Patient reports that over the last several months she has noticed a decline in her memory during daily activities.  She states her main concerns are that she is now forgetting appointments and phone numbers.  She states she used to have a very sharp memory.  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  Patient reports she did have a TIA back in 2018 and she just had an MRI of her brain on Saturday but has not yet received the results.  Patient also reports years of sleeping difficulties and depression.   Progress Note/Certification   Onset of Illness/Injury or Date of Surgery   (\"A couple months ago\")   Therapy Frequency 1-2x/week   Predicted Duration 2-4 weeks   Progress Note Due Date   (Visit 10)   Goals   OT Goals 1;2   OT Goal 1   Goal Identifier Memory   Goal Description Patient will report improved ability to remember appointments from current rating of 7/10 on PSFS to 9/10 or higher for improved ability to manage her health.   Goal Progress Goal Met; Patient rates ability to remember appointments at 9/10 on PSFS.   Target Date 11/30/23   OT Goal 2   Goal Identifier Sleep Hygiene   Goal Description Patient will report understanding and implementation of sleep hygiene strategies for improved ability to complete caregiver tasks.   Goal Progress Goal Met.  Patient reports improved sleep and functioning with implementation of sleep hygiene strategies.   Target Date 11/30/23   Subjective Report   Subjective Report Patient reports that she has been sleeping better and has noticed an improvement in her " functioning and memory with implementation of sleep hygiene strategies.   Treatment Interventions (OT)   Interventions Cognitive Skills   Self Care/Home Management   Self-Care/Home Mgmt/ADL, Compensatory, Meal Prep Minutes (59144) 20 Minutes   Self Care 1 Sleep Hygiene   Self Care 1 - Details Reviewed sleep hygiene strategies and added 2 new ones.  Patient verbalizes understanding.   Skilled Intervention Education on sleep/wake cycles, sleep hygiene, and sleep quality as it relates to memory.   Cognitive Skills   Cognitive Skills Intervention Cognitive Skills Intervention 2   Cognitive Skills Intervention 1 Education on memory changes   Cognitive Skills Intervention 1 - Details Not reviewed this date.   Cognitive Skills Intervention 2 Compensatory Strategies   Cognitive Skills Intervention 2 - Details Discussed compensatory techniques, environmental modifications, stress reduction techniques, and sensory regulation/ sensory diet.   Skilled Intervention Education and problem solving, provided written handouts of all information discussed.   Patient Response/Progress Verbalized understanding of all imformation provided.   Eval/Assessments   Assessments   ([facility] Mental Status exam (25/30))   Education   Learner/Method Patient   Education Comments See above   Plan   Home program Compensatory strategies for memory   Updates to plan of care Patient has met OT goals and would like to go call to schedule if symptoms exacerbate and she wants more OT appointments.  Told her, her chart will be open for 2 months.   Plan for next session PRN OT Appts   Total Session Time   Timed Code Treatment Minutes 20   Total Treatment Time (sum of timed and untimed services) 20     Beginning/End Dates of Progress Note Reporting Period:  11/2/23 to 11/30/23    Progress Toward Goals:   Progress this reporting period: Patient has made good progress this reporting period and has met occupational therapy goals.     Client Self  (Subjective) Report for Progress Note Reporting Period: Patient reports that she has been sleeping better and has noticed an improvement in her functioning and memory with implementation of sleep hygiene strategies.

## 2023-12-19 ENCOUNTER — LAB (OUTPATIENT)
Dept: LAB | Facility: OTHER | Age: 63
End: 2023-12-19
Attending: INTERNAL MEDICINE
Payer: COMMERCIAL

## 2023-12-19 DIAGNOSIS — E78.2 MIXED HYPERLIPIDEMIA: ICD-10-CM

## 2023-12-19 DIAGNOSIS — Z13.1 SCREENING FOR DIABETES MELLITUS: ICD-10-CM

## 2023-12-19 DIAGNOSIS — R51.9 NONINTRACTABLE HEADACHE, UNSPECIFIED CHRONICITY PATTERN, UNSPECIFIED HEADACHE TYPE: ICD-10-CM

## 2023-12-19 LAB
ALBUMIN SERPL BCG-MCNC: 4.3 G/DL (ref 3.5–5.2)
ALP SERPL-CCNC: 86 U/L (ref 40–150)
ALT SERPL W P-5'-P-CCNC: 16 U/L (ref 0–50)
ANION GAP SERPL CALCULATED.3IONS-SCNC: 10 MMOL/L (ref 7–15)
AST SERPL W P-5'-P-CCNC: 17 U/L (ref 0–45)
BILIRUB SERPL-MCNC: 1.1 MG/DL
BUN SERPL-MCNC: 15.3 MG/DL (ref 8–23)
CALCIUM SERPL-MCNC: 10.1 MG/DL (ref 8.8–10.2)
CHLORIDE SERPL-SCNC: 105 MMOL/L (ref 98–107)
CHOLEST SERPL-MCNC: 164 MG/DL
CREAT SERPL-MCNC: 0.86 MG/DL (ref 0.51–0.95)
DEPRECATED HCO3 PLAS-SCNC: 27 MMOL/L (ref 22–29)
EGFRCR SERPLBLD CKD-EPI 2021: 76 ML/MIN/1.73M2
ERYTHROCYTE [SEDIMENTATION RATE] IN BLOOD BY WESTERGREN METHOD: 3 MM/HR (ref 0–30)
FASTING STATUS PATIENT QL REPORTED: NORMAL
GLUCOSE SERPL-MCNC: 106 MG/DL (ref 70–99)
HDLC SERPL-MCNC: 52 MG/DL
LDLC SERPL CALC-MCNC: 98 MG/DL
MAGNESIUM SERPL-MCNC: 1.9 MG/DL (ref 1.7–2.3)
NONHDLC SERPL-MCNC: 112 MG/DL
POTASSIUM SERPL-SCNC: 3.6 MMOL/L (ref 3.4–5.3)
PROT SERPL-MCNC: 7.2 G/DL (ref 6.4–8.3)
SODIUM SERPL-SCNC: 142 MMOL/L (ref 135–145)
TRIGL SERPL-MCNC: 72 MG/DL

## 2023-12-19 PROCEDURE — 83735 ASSAY OF MAGNESIUM: CPT | Mod: ZL

## 2023-12-19 PROCEDURE — 80061 LIPID PANEL: CPT | Mod: ZL

## 2023-12-19 PROCEDURE — 36415 COLL VENOUS BLD VENIPUNCTURE: CPT | Mod: ZL

## 2023-12-19 PROCEDURE — 80053 COMPREHEN METABOLIC PANEL: CPT | Mod: ZL

## 2023-12-19 PROCEDURE — 85652 RBC SED RATE AUTOMATED: CPT | Mod: ZL

## 2024-03-20 ENCOUNTER — OFFICE VISIT (OUTPATIENT)
Dept: FAMILY MEDICINE | Facility: OTHER | Age: 64
End: 2024-03-20
Attending: PHYSICIAN ASSISTANT
Payer: COMMERCIAL

## 2024-03-20 VITALS
SYSTOLIC BLOOD PRESSURE: 138 MMHG | DIASTOLIC BLOOD PRESSURE: 96 MMHG | HEIGHT: 61 IN | HEART RATE: 78 BPM | OXYGEN SATURATION: 98 % | RESPIRATION RATE: 18 BRPM | TEMPERATURE: 96.9 F | BODY MASS INDEX: 21.6 KG/M2 | WEIGHT: 114.4 LBS

## 2024-03-20 DIAGNOSIS — G43.909 MIGRAINE WITHOUT STATUS MIGRAINOSUS, NOT INTRACTABLE, UNSPECIFIED MIGRAINE TYPE: Primary | ICD-10-CM

## 2024-03-20 DIAGNOSIS — J02.0 STREP THROAT: ICD-10-CM

## 2024-03-20 DIAGNOSIS — J02.9 SORE THROAT: ICD-10-CM

## 2024-03-20 DIAGNOSIS — I10 BENIGN ESSENTIAL HYPERTENSION: ICD-10-CM

## 2024-03-20 DIAGNOSIS — Z86.73 HISTORY OF STROKE: ICD-10-CM

## 2024-03-20 DIAGNOSIS — G43.909 MIGRAINE WITHOUT STATUS MIGRAINOSUS, NOT INTRACTABLE, UNSPECIFIED MIGRAINE TYPE: ICD-10-CM

## 2024-03-20 DIAGNOSIS — E78.2 MIXED HYPERLIPIDEMIA: ICD-10-CM

## 2024-03-20 LAB — GROUP A STREP BY PCR: DETECTED

## 2024-03-20 PROCEDURE — G0463 HOSPITAL OUTPT CLINIC VISIT: HCPCS

## 2024-03-20 PROCEDURE — G0463 HOSPITAL OUTPT CLINIC VISIT: HCPCS | Mod: 25,27

## 2024-03-20 PROCEDURE — 99214 OFFICE O/P EST MOD 30 MIN: CPT | Performed by: PHYSICIAN ASSISTANT

## 2024-03-20 PROCEDURE — 96372 THER/PROPH/DIAG INJ SC/IM: CPT | Performed by: PHYSICIAN ASSISTANT

## 2024-03-20 PROCEDURE — 250N000011 HC RX IP 250 OP 636: Performed by: PHYSICIAN ASSISTANT

## 2024-03-20 PROCEDURE — 87651 STREP A DNA AMP PROBE: CPT | Mod: ZL | Performed by: PHYSICIAN ASSISTANT

## 2024-03-20 RX ORDER — ATORVASTATIN CALCIUM 10 MG/1
10 TABLET, FILM COATED ORAL EVERY EVENING
Qty: 90 TABLET | Refills: 3 | Status: SHIPPED | OUTPATIENT
Start: 2024-03-20

## 2024-03-20 RX ORDER — CHLORTHALIDONE 25 MG/1
25 TABLET ORAL DAILY
Qty: 90 TABLET | Refills: 3 | Status: SHIPPED | OUTPATIENT
Start: 2024-03-20

## 2024-03-20 RX ORDER — AMLODIPINE BESYLATE 5 MG/1
5 TABLET ORAL DAILY
Qty: 90 TABLET | Refills: 3 | Status: SHIPPED | OUTPATIENT
Start: 2024-03-20

## 2024-03-20 RX ORDER — AZITHROMYCIN 250 MG/1
TABLET, FILM COATED ORAL
Qty: 6 TABLET | Refills: 0 | Status: SHIPPED | OUTPATIENT
Start: 2024-03-20 | End: 2024-03-25

## 2024-03-20 RX ORDER — CARVEDILOL 25 MG/1
25 TABLET ORAL 2 TIMES DAILY WITH MEALS
Qty: 90 TABLET | Refills: 3 | Status: SHIPPED | OUTPATIENT
Start: 2024-03-20

## 2024-03-20 RX ORDER — RIMEGEPANT SULFATE 75 MG/75MG
75 TABLET, ORALLY DISINTEGRATING ORAL DAILY PRN
Qty: 30 TABLET | Refills: 5 | OUTPATIENT
Start: 2024-03-20

## 2024-03-20 RX ORDER — PREDNISONE 20 MG/1
40 TABLET ORAL DAILY
Qty: 4 TABLET | Refills: 0 | Status: SHIPPED | OUTPATIENT
Start: 2024-03-20 | End: 2024-03-22

## 2024-03-20 RX ORDER — KETOROLAC TROMETHAMINE 30 MG/ML
30 INJECTION, SOLUTION INTRAMUSCULAR; INTRAVENOUS ONCE
Status: DISCONTINUED | OUTPATIENT
Start: 2024-03-20 | End: 2024-03-20

## 2024-03-20 RX ORDER — KETOROLAC TROMETHAMINE 30 MG/ML
30 INJECTION, SOLUTION INTRAMUSCULAR; INTRAVENOUS ONCE
Status: COMPLETED | OUTPATIENT
Start: 2024-03-20 | End: 2024-03-20

## 2024-03-20 RX ORDER — RIMEGEPANT SULFATE 75 MG/75MG
75 TABLET, ORALLY DISINTEGRATING ORAL DAILY PRN
Qty: 30 TABLET | Refills: 5 | Status: SHIPPED | OUTPATIENT
Start: 2024-03-20

## 2024-03-20 RX ADMIN — KETOROLAC TROMETHAMINE 30 MG: 30 INJECTION, SOLUTION INTRAMUSCULAR; INTRAVENOUS at 12:04

## 2024-03-20 ASSESSMENT — PAIN SCALES - GENERAL: PAINLEVEL: WORST PAIN (10)

## 2024-03-20 NOTE — PROGRESS NOTES
Assessment & Plan   Problem List Items Addressed This Visit          Nervous and Auditory    Migraine without status migrainosus, not intractable, unspecified migraine type - Primary    Relevant Medications    NURTEC 75 MG ODT tablet    amLODIPine (NORVASC) 5 MG tablet    carvedilol (COREG) 25 MG tablet    predniSONE (DELTASONE) 20 MG tablet    ketorolac (TORADOL) injection 30 mg (Completed)       Circulatory    Benign essential hypertension    Relevant Medications    amLODIPine (NORVASC) 5 MG tablet    carvedilol (COREG) 25 MG tablet    chlorthalidone (HYGROTON) 25 MG tablet     Other Visit Diagnoses       History of stroke        Relevant Medications    atorvastatin (LIPITOR) 10 MG tablet    Mixed hyperlipidemia        Relevant Medications    atorvastatin (LIPITOR) 10 MG tablet    Sore throat        Relevant Orders    Group A Streptococcus PCR Throat Swab (Completed)    Strep throat        Relevant Medications    azithromycin (ZITHROMAX) 250 MG tablet           Migraine: Unremarkable neurologic exam today.  No emergent head CT is warranted at this time.  Discussed symptoms and concerns at length.  Refilled Nurtec.  Completed strep test today to rule out infection concerns with increased headaches.    Sore throat: Completed strep test rule out bacterial infection concerns.  Patient was positive for strep throat.  Sent azithromycin to the pharmacy for treatment.  Encouraged to switch out her toothbrush on day 3 of the antibiotic.  Can use over-the-counter cough and cold remedies as needed for symptomatic relief.  Return as needed for recheck if symptoms or not improving or worsening.    Mixed hyperlipidemia and history of stroke: Refilled Lipitor.  Stable.  No acute concerns at this time.  Up-to-date on lab work.    Hypertension: Patient's blood pressure is mildly elevated.  Continue medications as previously prescribed.  Continue to monitor blood pressure over the next month.  Encourage close follow-up if blood  "pressure does not return to the normal range.  Low-salt diet.  Gave warning signs and symptoms.    Ordering of each unique test  Prescription drug management  30 minutes spent by me on the date of the encounter doing chart review, history and exam, documentation and further activities per the note     See Patient Instructions    Return if symptoms worsen or fail to improve.      Diomedes Mccarty is a 63 year old, presenting for the following health issues:  Throat Problem        3/20/2024    11:27 AM   Additional Questions   Roomed by Madison valderrama     History of Present Illness       Headaches:   Since the patient's last clinic visit, headaches are: worsened  The patient is getting headaches:  Every day  She is not able to do normal daily activities when she has a migraine.  The patient is taking the following rescue/relief medications:  Ibuprofen (Advil, Motrin), Tylenol, Excedrin and other   Patient states \"I get no relief\" from the rescue/relief medications.   The patient is taking the following medications to prevent migraines:  Other  In the past 4 weeks, the patient has gone to an Urgent Care or Emergency Room 0 times times due to headaches.    She eats 2-3 servings of fruits and vegetables daily.She consumes 2 sweetened beverage(s) daily.She exercises with enough effort to increase her heart rate 30 to 60 minutes per day.  She exercises with enough effort to increase her heart rate 7 days per week.   She is taking medications regularly.     Patient has been struggling with a headache over the last 5 days.  Persistent.  Has a sore throat.  All of her head pain.  Has a runny nose.  No sinus pain, pressure, GI symptoms.  No fever.  Has some cough.  No wheezing or rattling in the lungs.  Has tried multiple medications including Tylenol, ibuprofen, Advil, and Aleve.  Out of her Nurtec.  Wondering about getting a refill.  History of migraines.    Hypertension: Tolerates her medications well.  Out of medication " "refills.  Tolerates medications well.  No side effects noted.  Up-to-date on lab work.    Mixed hyperlipidemia: Currently stable.  Tolerates her medication well.  No side effects noted.    Review of Systems  Constitutional, neuro, ENT, endocrine, pulmonary, cardiac, gastrointestinal, genitourinary, musculoskeletal, integument and psychiatric systems are negative, except as otherwise noted.      Objective    BP (!) 138/96   Pulse 78   Temp 96.9  F (36.1  C) (Tympanic)   Resp 18   Ht 1.549 m (5' 1\")   Wt 51.9 kg (114 lb 6.4 oz)   LMP 05/12/2003 (Approximate)   SpO2 98%   Breastfeeding No   BMI 21.62 kg/m    Body mass index is 21.62 kg/m .  Physical Exam  Vitals reviewed.   Constitutional:       General: She is not in acute distress.     Appearance: Normal appearance. She is well-developed. She is not ill-appearing.   HENT:      Head: Normocephalic.      Right Ear: Tympanic membrane, ear canal and external ear normal.      Left Ear: Tympanic membrane, ear canal and external ear normal.      Nose: Nose normal.      Mouth/Throat:      Mouth: Mucous membranes are moist.      Pharynx: Posterior oropharyngeal erythema present. No oropharyngeal exudate.   Eyes:      General:         Right eye: No discharge.         Left eye: No discharge.      Extraocular Movements: Extraocular movements intact.      Conjunctiva/sclera: Conjunctivae normal.      Pupils: Pupils are equal, round, and reactive to light.   Cardiovascular:      Rate and Rhythm: Normal rate and regular rhythm.      Heart sounds: Normal heart sounds, S1 normal and S2 normal. No murmur heard.  Pulmonary:      Effort: Pulmonary effort is normal. No respiratory distress.      Breath sounds: Normal breath sounds. No wheezing or rales.   Musculoskeletal:         General: No swelling, tenderness or signs of injury. Normal range of motion.      Cervical back: Normal range of motion and neck supple.      Right lower leg: No edema.      Left lower leg: No edema. "   Lymphadenopathy:      Cervical: No cervical adenopathy.   Skin:     General: Skin is warm and dry.      Capillary Refill: Capillary refill takes less than 2 seconds.      Findings: No rash.   Neurological:      General: No focal deficit present.      Mental Status: She is alert and oriented to person, place, and time.      Cranial Nerves: Cranial nerves 2-12 are intact. No cranial nerve deficit.      Sensory: Sensation is intact. No sensory deficit.      Motor: Motor function is intact. No weakness or abnormal muscle tone.      Coordination: Coordination is intact. Romberg sign negative. Coordination normal. Finger-Nose-Finger Test normal.      Gait: Gait normal.      Deep Tendon Reflexes: Reflexes are normal and symmetric.   Psychiatric:         Mood and Affect: Mood normal.         Behavior: Behavior normal.         Thought Content: Thought content normal.         Judgment: Judgment normal.            Results for orders placed or performed in visit on 03/20/24   Group A Streptococcus PCR Throat Swab     Status: Abnormal    Specimen: Throat; Swab   Result Value Ref Range    Group A strep by PCR Detected (A) Not Detected    Narrative    The Xpert Xpress Strep A test, performed on the "[employer] Systems, is a rapid, qualitative in vitro diagnostic test for the detection of Streptococcus pyogenes (Group A ß-hemolytic Streptococcus, Strep A) in throat swab specimens from patients with signs and symptoms of pharyngitis. The Xpert Xpress Strep A test can be used as an aid in the diagnosis of Group A Streptococcal pharyngitis. The assay is not intended to monitor treatment for Group A Streptococcus infections. The Xpert Xpress Strep A test utilizes an automated real-time polymerase chain reaction (PCR) to detect Streptococcus pyogenes DNA.           Signed Electronically by: Arlene Berry PA-C

## 2024-03-20 NOTE — PATIENT INSTRUCTIONS
Sore throat and headache:   May use symptomatic care with tylenol or ibuprofen. Sudafed or mucinex work well for congestion. May use cough syrup or cough drops.  Using a humidifier works well to break up the congestion. You can also sleep propped up on a couple pillows to decrease symptoms at night.    Please take tylenol as needed up to 4 times daily.  Treat symptomatically with warm salt water gargles.  Lozenges, Tylenol, Advil or Aleve as needed. Frequent swallows of cool liquid.  Oatmeal coats the throat and some patients find it soothes the pain. Encouraged warm teas or fluids with honey.     If you have sinus congestion -  Use a Neti Pot/sinus flush (Dipak Med Sinus Rinse) 3 times daily to irrigate sinuses/mucosal tissue.     Monitor for any fevers or chills. Return in 7-10 days if not feeling better. Please call clinic with any questions or concerns. Return to clinic with change/worsening of symptoms.   Encouraged fluids and rest.    Call 9-1-1 or go to the emergency room if you:  Have trouble breathing   Are drooling because you cannot swallow your saliva   Have swelling of the neck or tongue   Cannot move your neck or have trouble opening your mouth      Blood pressure is elevated today. Encouraged to monitor blood pressure a couple times a week over the next few weeks. Return in 3-4 weeks if blood pressure is persistently elevated for a recheck appointment. Work on diet and exercise to decrease blood pressure. Weight loss is helpful.  Decrease salt intake.      -- Learn about DASH Diet (http://bit.[employer]/DASHDiet - redirects to the NIH) for dietary ways to reduce blood pressure      DASH Diet: Care Instructions  Your Care Instructions     The DASH diet is an eating plan that can help lower your blood pressure. DASH stands for Dietary Approaches to Stop Hypertension. Hypertension is high blood pressure.  The DASH diet focuses on eating foods that are high in calcium, potassium, and magnesium. These nutrients can  lower blood pressure. The foods that are highest in these nutrients are fruits, vegetables, low-fat dairy products, nuts, seeds, and legumes. But taking calcium, potassium, and magnesium supplements instead of eating foods that are high in those nutrients does not have the same effect. The DASH diet also includes whole grains, fish, and poultry.  The DASH diet is one of several lifestyle changes your doctor may recommend to lower your high blood pressure. Your doctor may also want you to decrease the amount of sodium in your diet. Lowering sodium while following the DASH diet can lower blood pressure even further than just the DASH diet alone.  Follow-up care is a key part of your treatment and safety. Be sure to make and go to all appointments, and call your doctor if you are having problems. It's also a good idea to know your test results and keep a list of the medicines you take.  How can you care for yourself at home?  Following the DASH diet  Eat 4 to 5 servings of fruit each day. A serving is 1 medium-sized piece of fruit,   cup chopped or canned fruit, 1/4 cup dried fruit, or 4 ounces (  cup) of fruit juice. Choose fruit more often than fruit juice.  Eat 4 to 5 servings of vegetables each day. A serving is 1 cup of lettuce or raw leafy vegetables,   cup of chopped or cooked vegetables, or 4 ounces (  cup) of vegetable juice. Choose vegetables more often than vegetable juice.  Get 2 to 3 servings of low-fat and fat-free dairy each day. A serving is 8 ounces of milk, 1 cup of yogurt, or 1   ounces of cheese.  Eat 6 to 8 servings of grains each day. A serving is 1 slice of bread, 1 ounce of dry cereal, or   cup of cooked rice, pasta, or cooked cereal. Try to choose whole-grain products as much as possible.  Limit lean meat, poultry, and fish to 2 servings each day. A serving is 3 ounces, about the size of a deck of cards.  Eat 4 to 5 servings of nuts, seeds, and legumes (cooked dried beans, lentils, and split  peas) each week. A serving is 1/3 cup of nuts, 2 tablespoons of seeds, or   cup of cooked beans or peas.  Limit fats and oils to 2 to 3 servings each day. A serving is 1 teaspoon of vegetable oil or 2 tablespoons of salad dressing.  Limit sweets and added sugars to 5 servings or less a week. A serving is 1 tablespoon jelly or jam,   cup sorbet, or 1 cup of lemonade.  Eat less than 2,300 milligrams (mg) of sodium a day. If you limit your sodium to 1,500 mg a day, you can lower your blood pressure even more.  Be aware that all of these are the suggested number of servings for people who eat 1,800 to 2,000 calories a day. Your recommended number of servings may be different if you need more or fewer calories.  Tips for success  Start small. Do not try to make dramatic changes to your diet all at once. You might feel that you are missing out on your favorite foods and then be more likely to not follow the plan. Make small changes, and stick with them. Once those changes become habit, add a few more changes.  Try some of the following:  Make it a goal to eat a fruit or vegetable at every meal and at snacks. This will make it easy to get the recommended amount of fruits and vegetables each day.  Try yogurt topped with fruit and nuts for a snack or healthy dessert.  Add lettuce, tomato, cucumber, and onion to sandwiches.  Combine a ready-made pizza crust with low-fat mozzarella cheese and lots of vegetable toppings. Try using tomatoes, squash, spinach, broccoli, carrots, cauliflower, and onions.  Have a variety of cut-up vegetables with a low-fat dip as an appetizer instead of chips and dip.  Sprinkle sunflower seeds or chopped almonds over salads. Or try adding chopped walnuts or almonds to cooked vegetables.  Try some vegetarian meals using beans and peas. Add garbanzo or kidney beans to salads. Make burritos and tacos with mashed jauregui beans or black beans.  Where can you learn more?  Go to  "https://www.[employer].net/patiented  Enter H967 in the search box to learn more about \"DASH Diet: Care Instructions.\"  Current as of: March 1, 2023               Content Version: 13.7 2006-2023 [employer].   Care instructions adapted under license by your healthcare professional. If you have questions about a medical condition or this instruction, always ask your healthcare professional. [employer] disclaims any warranty or liability for your use of this information.         Lowering Your Blood Pressure with DASH  What is the DASH eating plan?  DASH (Dietary Approaches to Stop Hypertension) can help you prevent or lower high blood pressure. This eating plan provides the nutrients that help lower blood pressure: potassium, magnesium, calcium, protein and fiber.   If not controlled, high blood pressure may lead to heart disease, stroke or blindness.  This eating plan is rich in:   Fruits and vegetables  Whole grains  Fat-free or low-fat milk products  Fish and poultry (chicken, turkey, etc.)  Beans, seeds and nuts.  This eating plan is low in:   Salt and sodium  Sugar, sweets and drinks with sugar  Red meat, saturated fat and trans fat.  Lifestyle changes  Besides a healthy eating plan, other steps are needed to help control high blood pressure.   Stay at a healthy weight.  Be active for at least 30 minutes on most days.  If you drink alcohol, have no more than two drinks per day (for men) or one per day (for women).  If you take blood pressure medicine, take it as directed.  Losing weight with DASH  You can lose weight by eating fewer calories. It is best to take off pounds slowly over time. Talk to a dietitian about the best way to do this.  Staying active   To shed pounds, combine DASH with daily exercise. Start with a 15-minute walk each day. Slowly increase the time until you reach a total of 30 minutes on most days. To avoid weight gain, try for 60 minutes each day. Check with " your doctor before starting any exercise program.  Tips for less sodium  Avoid processed foods. These may include baked goods, cereals, soy sauce and even antacids.  Cook with less salt. Don't bring the saltshaker to the table.  Season food with herbs, spices, lemon, lime, vinegar, wine and salt-free seasoning blends.  Use low-sodium canned vegetables or fruits.  Tips to ease the change  Take a week or two to slowly make changes to your diet.  Add a serving of vegetables at lunch one day. The next day, add a serving at dinner as well.  Add fruit to one meal or eat it as a snack.  Work up to three servings of fat-free and low-fat milk products each day.  If you eat large portions of meat, cut back by a third at each meal. The goal is to eat 6 oz (ounces) of meat or less per day.  Serve brown rice and whole-wheat bread and pasta.  Try casseroles and stir-polanco dishes that have less meat and more vegetables, grains or cooked dry beans.  Serve two or more meatless meals each week.  For snacks and desserts, eat foods low in fat, sodium and sugar, such as:  Unsalted rice cakes, nuts or seeds  Fresh fruits, raw vegetables and raisins  Fat-free, low-fat or frozen yogurt  Popcorn with no salt or butter.  If you have trouble digesting milk products, try lactose-free milk or take lactase pills.  If you have a nut allergy, eat seeds, beans, lentils or split peas.  Fruits, vegetables and whole grain foods are high in fiber. To avoid bloating and diarrhea (loose, watery stools), increase these foods over several weeks.  The DASH eating plan  Use this chart to plan your meals. Or take it with you when you shop for food.           Food Group Servings per Day  Serving Size Examples    1,600 Calories 2,000 Calories 2,600 Calories         Grains  (whole wheat) 6 6 to 8 10 to 11 1 slice bread  1 oz dry cereal    cup cooked rice, pasta or cereal Whole-wheat bread and rolls, whole-wheat pasta, English muffin, alfredo bread, bagel, cereals,  grits, oatmeal, brown rice, unsalted pretzels and popcorn   Vegetables  3 to 4 4 to 5 5 to 6 1 cup raw leafy vegetables    cup cut-up raw or cooked vegetable    cup vegetable juice Broccoli, carrots, collards, green beans, green peas, kale, lima beans, potatoes, spinach, squash, sweet potatoes, tomatoes   Fruits 4 4 to 5 5 to 6 1 medium fruit    cup dried fruit    cup fresh, frozen, or canned fruit    cup fruit juice Apples, apricots, bananas, dates, grapes, oranges, grapefruit, mangoes, melons, peaches, pineapples, raisins, strawberries, tangerines   Fat-free or   low-fat milk and milk products 2 to 3 2 to 3 3 1 cup milk or yogurt  1   oz cheese Fat-free or low-fat (1%) milk, buttermilk, cheese, regular or frozen yogurt   Lean meats, poultry and fish 3 to 6 6 or less 6 1 oz cooked meats, poultry (chicken, turkey) or fish  1 egg  2 egg whites Lean meats (trim away any fat, then broil, roast or poach); remove skin from poultry. Eggs are high in cholesterol, so limit egg yolks to four or less per week.   Nuts, seeds   and legumes 3 per week 4 to 5 per week 1 per day ? cup (1   oz) nuts  2 Tbsp peanut butter  2 Tbsp (   oz) seeds    cup cooked legumes (dry beans and peas) Almonds, hazelnuts, mixed nuts, peanuts, walnuts, sunflower seeds, peanut butter, kidney beans, lentils, split peas   Fats and oils 2 2 to 3 3 1 tsp soft margarine  1 tsp vegetable oil  1 Tbsp duffy  2 Tbsp salad dressing Soft margarine, vegetable oil (such as canola, corn, olive or safflower), low-fat mayonnaise, light salad dressing   Sweets and   added sugars 0 5 or less per week 2 or less per day 1 Tbsp sugar, jelly or jam    cup sorbet or gelatin  1 cup lemonade Fruit-flavored gelatin, fruit punch, hard candy, jelly, maple syrup, sorbets and ices   A sample meal plan  This sample menu gives two sodium levels. Start with 2,300 mg of sodium (about 1 teaspoon of table salt per day). Then, try to lower your intake to 1,500 mg a day. Talk to your  "doctor or dietitian about how to do this.   These samples are for people who eat 2,000 calories per day. The less salt you eat, the more you may lower your blood pressure.   Menu for 2,300 mg sodium per day   Breakfast:   cup instant oatmeal, 1 mini whole-wheat bagel, 1 tablespoon peanut butter, 1 medium banana, 1 cup (8 ounces) low-fat milk.   Lunch: 1 cup cantaloupe chunks, 1 cup apple juice and one chicken breast sandwich that includes:  Two slices (3 ounces) chicken breast, no skin  Two slices whole-wheat bread  1 slice (   ounce) reduced-fat cheddar cheese  One leaf steven lettuce  Two slices tomato  1 tablespoon low-fat mayonnaise.  Dinner: 1 cup cooked spaghetti,   cup low-salt vegetarian spaghetti sauce, 3 tablespoons Parmesan cheese;   cup corn (from frozen);   cup canned pears in juice; one spinach salad that includes:  1 cup fresh spinach leaves    cup fresh carrots, grated    cup fresh mushrooms, sliced  1 tablespoon vinegar and oil dressing.  Snacks:? cup unsalted almonds;   cup dried apricots; 1 cup fat-free fruit yogurt, no sugar added.  Reducing to 1,500 mg sodium per day  Use the same menu plan, but:  For breakfast, replace instant oatmeal with regular oatmeal and 1 teaspoon cinnamon.  For lunch, replace cheddar cheese with low-sodium Swiss cheese.  Resources on diet and health  National Heart, [facility] [place]  [facility]HLBI [facility] Information [facility]  [address] [address]   [place] [address]   Phone: [phone]   TTY: [phone]   www.nhlbi.nih.gov   \"Aim for a Healthy Weight\"   www.nhlbi.nih.gov/health/public/heart/obesity/lose_wt/index.htm  \"Dietary Guidelines for Americans 2005\"   and \"A Healthier You\"   www.healthierus.gov/dietaryguidelines  For informational purposes only. Not to replace the advice of your health care provider. Adapted from \"Your Guide to Lowering Blood Pressure with DASH,\" by the National Heart, Lung and Blood [place],   NIH Publication No. , revised April 2006. " Available at www.nhlbi.nih.gov/health/public/heart/hbp/dash/index.htm. Published by [employer]. [employer] [id number] - REV 09/15.  For informational purposes only. Not to replace the advice of your health care provider.  Copyright   2018 [employer]. All rights reserved.

## 2024-03-20 NOTE — NURSING NOTE
Patient is here for headache for past 5 days. States she ran out of her medication. Having sore throat, and feeling tired and achy.     Patient's last menstrual period was 05/12/2003 (approximate).  Medication Reconciliation: complete    Madison Garcia LPN 3/20/2024 11:33 AM       Advance care directive on file? no  Advance care directive provided to patient? no       Madison Garcia LPN    Clinic Administered Medication Documentation        Patient was given toradol . Prior to medication administration, verified patient's identity using patient s name and date of birth. Please see MAR and medication order for additional information. Patient instructed to remain in clinic for 15 minutes and report any adverse reaction to staff immediately.    Vial/Syringe: Single dose vial. Was entire vial of medication used? Yes

## 2024-04-03 ENCOUNTER — TELEPHONE (OUTPATIENT)
Dept: FAMILY MEDICINE | Facility: OTHER | Age: 64
End: 2024-04-03
Payer: COMMERCIAL

## 2024-04-03 DIAGNOSIS — G43.909 MIGRAINE WITHOUT STATUS MIGRAINOSUS, NOT INTRACTABLE, UNSPECIFIED MIGRAINE TYPE: Primary | ICD-10-CM

## 2024-04-03 NOTE — TELEPHONE ENCOUNTER
We received notice that the Nurtec is not covered under her insurance.  Is she is still able to get the medication?  Has she tried other migraine medications?  Arlene Berry PA-C.......... 4/3/2024  12:22 PM

## 2024-04-03 NOTE — TELEPHONE ENCOUNTER
[facility]- Received PA denial from [facility] for Nurtec 75MG dispersible tablets.  Faxed the denial / appeal information to HIM.  Sent a telephone message to the provider.     Denied - This drug is also not a preferred drug on your plan. Before this drug may be covered, you must also meet the Non-Preferred Drug Prior Authorization Criteria for Antimigraine Agents, Other.     Appeals- Mailing address: [facility] Appeals and [address].  [address], [place] [address].  Phone:[phone]  Fax:[phone]

## 2024-04-05 NOTE — TELEPHONE ENCOUNTER
She stated in the past she was able to get the Nurtec when a PA was done but she has not been able to get it now and she has not tried any other medications for her migraines.  Amanda Michael LPN..................4/5/2024   8:31 AM

## 2024-04-05 NOTE — TELEPHONE ENCOUNTER
I can complete an appeal letter to see if we can get the medication covered.     In order to complete a thorough letter, have you tried Imitrex (sumatriptan), Maxalt (rizatriptan), or other migraine as needed medications?  What have you used for over the counter and prescription meds to help treat your migraines that have not worked?    Please forward me the answers to the questions.  Arlene Berry PA-C ..................4/5/2024 9:59 AM

## 2024-04-08 ENCOUNTER — TELEPHONE (OUTPATIENT)
Dept: INTERNAL MEDICINE | Facility: OTHER | Age: 64
End: 2024-04-08
Payer: COMMERCIAL

## 2024-04-08 RX ORDER — RIZATRIPTAN BENZOATE 10 MG/1
10 TABLET ORAL
Qty: 15 TABLET | Refills: 11 | Status: SHIPPED | OUTPATIENT
Start: 2024-04-08

## 2024-04-08 NOTE — TELEPHONE ENCOUNTER
Patient informed to contact her insurance to see what medications they cover for migraines and let us know. A new script can then be sent.     Ivonne Whaley CMA on 4/8/2024 at 10:20 AM

## 2024-04-08 NOTE — TELEPHONE ENCOUNTER
Patient states that the Nurtec that was prescribed has not yet been filled as it is waiting on a prior auth. She is wondering if in the mean time could Oja prescribe anything her insurance could accept until that is filled as she is having many terrible headaches. Can be reach by phone to discuss has voicemail as well.    Rosmery Velasco on 4/8/2024 at 9:35 AM

## 2024-04-08 NOTE — TELEPHONE ENCOUNTER
Please let the patient know that I sent Maxalt to the pharmacy to see if this will help treat her migraines.   Arlene Berry PA-C ..................4/8/2024 4:34 PM

## 2024-04-16 DIAGNOSIS — G43.909 MIGRAINE WITHOUT STATUS MIGRAINOSUS, NOT INTRACTABLE, UNSPECIFIED MIGRAINE TYPE: ICD-10-CM

## 2024-04-16 RX ORDER — RIMEGEPANT SULFATE 75 MG/75MG
75 TABLET, ORALLY DISINTEGRATING ORAL DAILY PRN
Qty: 30 TABLET | Refills: 5 | Status: CANCELLED | OUTPATIENT
Start: 2024-04-16

## 2024-04-16 NOTE — TELEPHONE ENCOUNTER
NURTEC 75 MG ODT tablet 30 tablet 5 3/20/2024 -- Yes   Sig - Route: Place 1 tablet (75 mg) under the tongue daily as needed for migraine - MAXIMUM OF 6 TABLETS PER 30 DAYS - Sublingual   Sent to pharmacy as: Nurtec 75 MG Oral Tablet Disintegrating   Class: E-Prescribe   Order: [id number]   E-Prescribing Status: Receipt confirmed by pharmacy (3/28/2024  1:11 PM CDT)   Prior authorization: Payer Waiting for Response     [facility] - [place], [place] - [address]     Detailed message left for Pt with the prescription information above. Jennifer Mora RN .............. 4/16/2024  8:24 AM

## 2024-04-16 NOTE — TELEPHONE ENCOUNTER
Reason for call: Medication or medication refill    Have you contacted your pharmacy regarding this refill? No, need new prescription    If No, direct the patient to contact the Pharmacy and discontinue telephone note using Erroneous Encounter.  If Yes, continue.    Name of medication requested: Nurtec    How many days of medication do you have left? none    What pharmacy do you use? [facility]    Preferred method for responding to this message: Telephone Call    Phone number patient can be reached at: Cell number on file:    Telephone Information:   Mobile [phone]       If we cannot reach you directly, may we leave a detailed response at the number you provided? Yes

## 2024-09-19 ENCOUNTER — TELEPHONE (OUTPATIENT)
Dept: INTERNAL MEDICINE | Facility: OTHER | Age: 64
End: 2024-09-19
Payer: COMMERCIAL

## 2024-09-19 NOTE — TELEPHONE ENCOUNTER
They have several questions on a medication for Nurtec.  Please call        Ginna Smith on 9/19/2024 at 3:15 PM

## 2024-09-20 NOTE — TELEPHONE ENCOUNTER
Called number provided and was informed they already received what they needed from another nurse     Ivonne Whaley CMA on 9/20/2024 at 1:15 PM

## 2024-11-04 NOTE — PROGRESS NOTES
[facility] HEART CARE   CARDIOLOGY PROGRESS NOTE     Chief Complaint   Patient presents with    Consult For     Abnormal EKG          Diagnosis:  1.  Abnormal EKG.   KAYLEIGH, septal infarct, and prolonged QT.  2.  MG.   -Inconclusive but at risk on 9/26/2024.   -Wakes up at night gasping for air.  3.  Prolonged QT.   -Normal on 11/5/24.   -QTc of 523 on 9/11/24.  4.  Carotid artery dz.   <50%, right on 1/13/2018.  5.  Hypertension-uncontrolled.  6.  Fatigue.  7.  Tobacco abuse.   -20 yrs at 1 pack/day.   -Quit 2022.         Assessment/Plan:    1.  Abnormal EKG: Was seen by her primary through [facility] on 10/11/2024.  Had an EKG with reported right atrial enlargement, septal infarct, and prolonged QTc.  Repeat EKG today shows normalization of QTc no evidence of septal infarct, but right atrial enlargement.  Findings discussed, patient reassured.  Plan for echocardiogram.  Patient has no history of stenting or bypass.  Not having any chest discomfort or anginal symptoms.  I suspect this is a combination of lead placement and variability in heart rate versus electrolyte abnormalities.  Patient reassured.  2.  Hypertension: Remains uncontrolled.  Currently on amlodipine 5 mg daily, Coreg 25 mg twice a day, and chlorthalidone 25 mg once daily.  Started on losartan 50 mg daily as her blood pressure today was 150/82 and frequently is high.  Potassium was low normal on 12/19/2023 at 3.6.  3.  JOSÉ: Unknown etiology.  Not having chest pain or anginal symptoms.  Normal PFTs on 10/22/2024.  Does have history of tobacco abuse.  Plan for chest x-ray, labs, and echocardiogram.  4.  Labs today.  Labs today because of JOSÉ.  Plan for magnesium, BMP, TSH, A1c, CBC, D-dimer, lipid, and CMP.  5.  Carotid artery stenosis: 50% on the right in 2018.  Plan for repeat ultrasound carotid arteries.  6.  Tobacco abuse: Quit in approximately 2022.  Congratulated for success.  7.  Prolonged QTc.  QTc was 523 with a QT interval of 472 on 9/11/2024.  EKG  today shows QT of 380 and QTc of 433.  Patient reassured.  8.  Follow-up after labs, chest x-ray, echocardiogram, ultrasound carotids, initiating losartan 50 mg once daily      Interval history:  See above.      Relevant testing:  CTA head and neck angio 1/13/2018:  Mild, less than 50% stenosis of the right internal carotid artery due  to atherosclerotic plaque at the carotid bifurcation.     Symmetric, slightly small caliber of the intracranial internal carotid  arteries (appearing smaller for example than the dominant left  vertebral artery) is nonspecific and may simply reflect  atherosclerotic disease.          ICD-10-CM    1. Abnormal electrocardiogram  R94.31 EKG 12-lead, tracing only     Echocardiogram Complete      2. JOSÉ (dyspnea on exertion)  R06.09 Echocardiogram Complete     X-ray Chest 2 vws*     Comprehensive metabolic panel     D dimer quantitative     CBC with platelets     TSH Reflex GH     Thyrotropin (TSH) GH     N terminal pro BNP outpatient     Magnesium     Comprehensive metabolic panel     D dimer quantitative     CBC with platelets     TSH Reflex GH     N terminal pro BNP outpatient     Magnesium     CANCELED: Thyrotropin (TSH) GH      3. Right atrial enlargement  I51.7 Echocardiogram Complete      4. Carotid artery stenosis, asymptomatic, right  I65.21 US Carotid Bilateral      5. Benign essential hypertension  I10 losartan (COZAAR) 50 MG tablet      6. Hyperglycemia  R73.9 Hemoglobin A1c     Hemoglobin A1c      7. Lipid screening  Z13.220 Lipid Profile     Lipid Profile      8. History of tobacco abuse  Z87.891           Past Medical History:   Diagnosis Date    Adjustment disorder with mixed anxiety and depressed mood 08/07/2018    Atherosclerosis of right carotid artery 9/8/2020    Formatting of this note might be different from the original. Right common carotid 50%    Benign essential hypertension 1/9/2023    High risk HPV infection 11/19/2020    Formatting of this note might be  different from the original. Nov. 2020-normal cytology but positive HPV 16; colposcopy without any lesions, and ECC with no dysplasia.  Recommending repeat cotesting in 1 year    Migraine     Migraine without status migrainosus, not intractable, unspecified migraine type 1/9/2023    TIA (transient ischemic attack)     multiple in past       Past Surgical History:   Procedure Laterality Date    TUBAL LIGATION  1987       Allergies   Allergen Reactions    Pcn [Penicillins]        Current Outpatient Medications   Medication Sig Dispense Refill    losartan (COZAAR) 50 MG tablet Take 1 tablet (50 mg) by mouth daily. 90 tablet 3    amLODIPine (NORVASC) 5 MG tablet Take 1 tablet (5 mg) by mouth daily 90 tablet 3    aspirin (ASA) 81 MG EC tablet Take 81 mg by mouth daily      atorvastatin (LIPITOR) 10 MG tablet Take 1 tablet (10 mg) by mouth every evening 90 tablet 3    carvedilol (COREG) 25 MG tablet Take 1 tablet (25 mg) by mouth 2 times daily (with meals) 90 tablet 3    chlorthalidone (HYGROTON) 25 MG tablet Take 1 tablet (25 mg) by mouth daily 90 tablet 3    hydrOXYzine (ATARAX) 25 MG tablet Take 1 tablet (25 mg) by mouth 3 times daily as needed for anxiety 90 tablet 3    IBUPROFEN PO Take 800 mg by mouth daily       magnesium 250 MG tablet Take 1 tablet (250 mg) by mouth daily 90 tablet 1    NURTEC 75 MG ODT tablet Place 1 tablet (75 mg) under the tongue daily as needed for migraine - MAXIMUM OF 6 TABLETS PER 30 DAYS 30 tablet 5    rizatriptan (MAXALT) 10 MG tablet Take 1 tablet (10 mg) by mouth at onset of headache for migraine May repeat in 2 hours. Max 3 tablets/24 hours. 15 tablet 11       Social History     Socioeconomic History    Marital status: Single     Spouse name: Not on file    Number of children: Not on file    Years of education: Not on file    Highest education level: Not on file   Occupational History    Not on file   Tobacco Use    Smoking status: Former     Current packs/day: 0.00     Average  packs/day: 0.5 packs/day for 42.8 years (21.4 ttl pk-yrs)     Types: Cigarettes     Start date: 1980     Quit date: 2022     Years since quittin.0     Passive exposure: Past    Smokeless tobacco: Never    Tobacco comments:     trying to quit, has cut down - she states she is around people who smoke which makes it difficult, but hasn't had any herself for a few months 23   Vaping Use    Vaping status: Never Used   Substance and Sexual Activity    Alcohol use: Yes     Comment: rarely    Drug use: No    Sexual activity: Not Currently     Partners: Male   Other Topics Concern    Not on file   Social History Narrative    . On disability because of prior stroke.  2 [employer] ([place]) and [place] ()     Social Drivers of Health     Financial Resource Strain: Low Risk  (3/20/2024)    Financial Resource Strain     Within the past 12 months, have you or your family members you live with been unable to get utilities (heat, electricity) when it was really needed?: No   Food Insecurity: No Food Insecurity (2024)    Received from [facility]    Hunger Vital Sign     Worried About Running Out of Food in the Last Year: Never true     Ran Out of Food in the Last Year: Never true   Transportation Needs: Unmet Transportation Needs (2024)    Received from [facility]    PRAPARE - Transportation     Lack of Transportation (Medical): Yes     Lack of Transportation (Non-Medical): Yes   Physical Activity: Not on file   Stress: Not on file   Social Connections: Not on file   Interpersonal Safety: Low Risk  (2024)    Interpersonal Safety     Do you feel physically and emotionally safe where you currently live?: Yes     Within the past 12 months, have you been hit, slapped, kicked or otherwise physically hurt by someone?: No     Within the past 12 months, have you been humiliated or emotionally abused in other ways by  "your partner or ex-partner?: No   Housing Stability: Low Risk  (9/11/2024)    Received from [facility] and [facility]    Housing Stability Vital Sign     Unable to Pay for Housing in the Last Year: No     Number of Times Moved in the Last Year: 1     Homeless in the Last Year: No       LAB RESULTS:   No visits with results within 2 Month(s) from this visit.   Latest known visit with results is:   Office Visit on 03/20/2024   Component Date Value Ref Range Status    Group A strep by PCR 03/20/2024 Detected (A)  Not Detected Final        Review of systems: Negative except that which was noted in the HPI.    Physical examination:  BP (!) 142/82 (BP Location: Right arm, Patient Position: Sitting, Cuff Size: Adult Regular)   Pulse 83   Temp 98  F (36.7  C) (Temporal)   Resp 16   Ht 1.54 m (5' 0.63\")   Wt 56.7 kg (125 lb)   LMP 05/12/2003 (Approximate)   SpO2 97%   BMI 23.91 kg/m      GENERAL APPEARANCE: healthy, alert and no distress  CHEST: lungs clear to auscultation - no rales, rhonchi or wheezes, no use of accessory muscles, no retractions, respirations are unlabored, normal respiratory rate  CARDIOVASCULAR: regular rhythm, normal S1 with physiologic split S2, no S3 or S4 and no murmur, click or rub  EXTREMITIES: no clubbing, cyanosis or edema.    Total time spent on day of visit, including review of tests, obtaining/reviewing separately obtained history, ordering medications/tests/procedures, communicating with PCP/consultants, and documenting in electronic medical record: 46 minutes.              Thank you for allowing me to participate in the care of your patient. Please do not hesitate to contact me if you have any questions.     Familia Sandra, DO          "

## 2024-11-05 ENCOUNTER — OFFICE VISIT (OUTPATIENT)
Dept: CARDIOLOGY | Facility: OTHER | Age: 64
End: 2024-11-05
Attending: INTERNAL MEDICINE
Payer: COMMERCIAL

## 2024-11-05 ENCOUNTER — HOSPITAL ENCOUNTER (OUTPATIENT)
Dept: GENERAL RADIOLOGY | Facility: OTHER | Age: 64
Discharge: HOME | End: 2024-11-05
Attending: INTERNAL MEDICINE
Payer: COMMERCIAL

## 2024-11-05 VITALS
DIASTOLIC BLOOD PRESSURE: 82 MMHG | RESPIRATION RATE: 16 BRPM | TEMPERATURE: 98 F | HEART RATE: 83 BPM | BODY MASS INDEX: 23.6 KG/M2 | HEIGHT: 61 IN | OXYGEN SATURATION: 97 % | WEIGHT: 125 LBS | SYSTOLIC BLOOD PRESSURE: 142 MMHG

## 2024-11-05 DIAGNOSIS — R06.09 DOE (DYSPNEA ON EXERTION): ICD-10-CM

## 2024-11-05 DIAGNOSIS — Z87.891 HISTORY OF TOBACCO ABUSE: ICD-10-CM

## 2024-11-05 DIAGNOSIS — Z13.220 LIPID SCREENING: ICD-10-CM

## 2024-11-05 DIAGNOSIS — I65.21 CAROTID ARTERY STENOSIS, ASYMPTOMATIC, RIGHT: ICD-10-CM

## 2024-11-05 DIAGNOSIS — R73.9 HYPERGLYCEMIA: ICD-10-CM

## 2024-11-05 DIAGNOSIS — I10 BENIGN ESSENTIAL HYPERTENSION: ICD-10-CM

## 2024-11-05 DIAGNOSIS — R94.31 ABNORMAL ELECTROCARDIOGRAM: Primary | ICD-10-CM

## 2024-11-05 DIAGNOSIS — I51.7 RIGHT ATRIAL ENLARGEMENT: ICD-10-CM

## 2024-11-05 LAB
ALBUMIN SERPL BCG-MCNC: 4.4 G/DL (ref 3.5–5.2)
ALP SERPL-CCNC: 92 U/L (ref 40–150)
ALT SERPL W P-5'-P-CCNC: 20 U/L (ref 0–50)
ANION GAP SERPL CALCULATED.3IONS-SCNC: 9 MMOL/L (ref 7–15)
AST SERPL W P-5'-P-CCNC: 25 U/L (ref 0–45)
BILIRUB SERPL-MCNC: 0.8 MG/DL
BUN SERPL-MCNC: 24.5 MG/DL (ref 8–23)
CALCIUM SERPL-MCNC: 9.8 MG/DL (ref 8.8–10.4)
CHLORIDE SERPL-SCNC: 102 MMOL/L (ref 98–107)
CHOLEST SERPL-MCNC: 178 MG/DL
CREAT SERPL-MCNC: 1.04 MG/DL (ref 0.51–0.95)
D DIMER PPP FEU-MCNC: <=0.27 UG/ML FEU (ref 0–0.5)
EGFRCR SERPLBLD CKD-EPI 2021: 60 ML/MIN/1.73M2
ERYTHROCYTE [DISTWIDTH] IN BLOOD BY AUTOMATED COUNT: 12.7 % (ref 10–15)
EST. AVERAGE GLUCOSE BLD GHB EST-MCNC: 126 MG/DL
FASTING STATUS PATIENT QL REPORTED: NO
FASTING STATUS PATIENT QL REPORTED: NO
GLUCOSE SERPL-MCNC: 107 MG/DL (ref 70–99)
HBA1C MFR BLD: 6 %
HCO3 SERPL-SCNC: 30 MMOL/L (ref 22–29)
HCT VFR BLD AUTO: 45.8 % (ref 35–47)
HDLC SERPL-MCNC: 51 MG/DL
HGB BLD-MCNC: 15.4 G/DL (ref 11.7–15.7)
LDLC SERPL CALC-MCNC: 74 MG/DL
MAGNESIUM SERPL-MCNC: 2.2 MG/DL (ref 1.7–2.3)
MCH RBC QN AUTO: 29.6 PG (ref 26.5–33)
MCHC RBC AUTO-ENTMCNC: 33.6 G/DL (ref 31.5–36.5)
MCV RBC AUTO: 88 FL (ref 78–100)
NONHDLC SERPL-MCNC: 127 MG/DL
NT-PROBNP SERPL-MCNC: 234 PG/ML (ref 0–900)
PLATELET # BLD AUTO: 299 10E3/UL (ref 150–450)
POTASSIUM SERPL-SCNC: 3.6 MMOL/L (ref 3.4–5.3)
PROT SERPL-MCNC: 7.7 G/DL (ref 6.4–8.3)
RBC # BLD AUTO: 5.2 10E6/UL (ref 3.8–5.2)
SODIUM SERPL-SCNC: 141 MMOL/L (ref 135–145)
TRIGL SERPL-MCNC: 265 MG/DL
TSH SERPL DL<=0.005 MIU/L-ACNC: 1.3 UIU/ML (ref 0.3–4.2)
WBC # BLD AUTO: 6 10E3/UL (ref 4–11)

## 2024-11-05 PROCEDURE — 83735 ASSAY OF MAGNESIUM: CPT | Mod: ZL | Performed by: INTERNAL MEDICINE

## 2024-11-05 PROCEDURE — 83880 ASSAY OF NATRIURETIC PEPTIDE: CPT | Mod: ZL | Performed by: INTERNAL MEDICINE

## 2024-11-05 PROCEDURE — 99204 OFFICE O/P NEW MOD 45 MIN: CPT | Performed by: INTERNAL MEDICINE

## 2024-11-05 PROCEDURE — 93005 ELECTROCARDIOGRAM TRACING: CPT | Performed by: INTERNAL MEDICINE

## 2024-11-05 PROCEDURE — G0463 HOSPITAL OUTPT CLINIC VISIT: HCPCS | Mod: 25

## 2024-11-05 PROCEDURE — 36415 COLL VENOUS BLD VENIPUNCTURE: CPT | Mod: ZL | Performed by: INTERNAL MEDICINE

## 2024-11-05 PROCEDURE — 85379 FIBRIN DEGRADATION QUANT: CPT | Mod: ZL | Performed by: INTERNAL MEDICINE

## 2024-11-05 PROCEDURE — 71046 X-RAY EXAM CHEST 2 VIEWS: CPT

## 2024-11-05 PROCEDURE — 80061 LIPID PANEL: CPT | Mod: ZL | Performed by: INTERNAL MEDICINE

## 2024-11-05 PROCEDURE — 82435 ASSAY OF BLOOD CHLORIDE: CPT | Mod: ZL | Performed by: INTERNAL MEDICINE

## 2024-11-05 PROCEDURE — 83036 HEMOGLOBIN GLYCOSYLATED A1C: CPT | Mod: ZL | Performed by: INTERNAL MEDICINE

## 2024-11-05 PROCEDURE — 84443 ASSAY THYROID STIM HORMONE: CPT | Mod: ZL | Performed by: INTERNAL MEDICINE

## 2024-11-05 PROCEDURE — 85014 HEMATOCRIT: CPT | Mod: ZL | Performed by: INTERNAL MEDICINE

## 2024-11-05 PROCEDURE — 93010 ELECTROCARDIOGRAM REPORT: CPT | Performed by: INTERNAL MEDICINE

## 2024-11-05 RX ORDER — LOSARTAN POTASSIUM 50 MG/1
50 TABLET ORAL DAILY
Qty: 90 TABLET | Refills: 3 | Status: SHIPPED | OUTPATIENT
Start: 2024-11-05

## 2024-11-05 ASSESSMENT — PAIN SCALES - GENERAL: PAINLEVEL_OUTOF10: NO PAIN (0)

## 2024-11-05 NOTE — NURSING NOTE
"Chief Complaint   Patient presents with    Consult For     Abnormal EKG       Initial BP (!) 150/82 (BP Location: Right arm, Patient Position: Sitting, Cuff Size: Adult Regular)   Pulse 83   Temp 98  F (36.7  C) (Temporal)   Resp 16   Ht 1.54 m (5' 0.63\")   Wt 56.7 kg (125 lb)   LMP 05/12/2003 (Approximate)   SpO2 97%   BMI 23.91 kg/m   Estimated body mass index is 23.91 kg/m  as calculated from the following:    Height as of this encounter: 1.54 m (5' 0.63\").    Weight as of this encounter: 56.7 kg (125 lb).  Meds Reconciled: complete  Pt is on Aspirin  Pt is on a Statin  PHQ and/or AZAM reviewed. Pt referred to PCP/MH Provider as appropriate.    Rosio Melo LPN      "

## 2024-11-05 NOTE — LETTER
November 6, 2024      Bibiana G[address]mezffrey  [facility]   [place] [address]        Dear ,    We are writing to inform you of your test results.    Here is a copy of your lab results.  First of all, your labs show your kidney function is reduced.  You have stage II chronic kidney disease.  Kidney disease ranges from stage 1 which is the least severe to 5 being most severe.  This is not uncommon as we age.  This is something I would have you continue to follow with your primary in the future.  Second of all, your glucose/sugar was elevated at 107.  Your triglycerides which are part of your cholesterol were elevated.  This is elevated by sugars and carbohydrates.  Ideally, cut back on sugars and carbohydrates increase activity as best you are able.  Your hemoglobin A1c which is a 3-month average of your sugars came back at 6.0%.  This puts you in the prediabetic range.  You did not have to be fasting for this lab.  Otherwise, I do not see any other issues or concerns.    Resulted Orders   Comprehensive metabolic panel   Result Value Ref Range    Sodium 141 135 - 145 mmol/L    Potassium 3.6 3.4 - 5.3 mmol/L    Carbon Dioxide (CO2) 30 (H) 22 - 29 mmol/L    Anion Gap 9 7 - 15 mmol/L    Urea Nitrogen 24.5 (H) 8.0 - 23.0 mg/dL    Creatinine 1.04 (H) 0.51 - 0.95 mg/dL    GFR Estimate 60 (L) >60 mL/min/1.73m2      Comment:      eGFR calculated using 2021 CKD-EPI equation.    Calcium 9.8 8.8 - 10.4 mg/dL      Comment:      Reference intervals for this test were updated on 7/16/2024 to reflect our healthy population more accurately. There may be differences in the flagging of prior results with similar values performed with this method. Those prior results can be interpreted in the context of the updated reference intervals.    Chloride 102 98 - 107 mmol/L    Glucose 107 (H) 70 - 99 mg/dL    Alkaline Phosphatase 92 40 - 150 U/L    AST 25 0 - 45 U/L    ALT 20 0 - 50 U/L    Protein Total 7.7 6.4 - 8.3 g/dL    Albumin 4.4  3.5 - 5.2 g/dL    Bilirubin Total 0.8 <=1.2 mg/dL    Patient Fasting > 8hrs? No    Lipid Profile   Result Value Ref Range    Cholesterol 178 <200 mg/dL    Triglycerides 265 (H) <150 mg/dL    Direct Measure HDL 51 >=50 mg/dL    LDL Cholesterol Calculated 74 <100 mg/dL    Non HDL Cholesterol 127 <130 mg/dL    Patient Fasting > 8hrs? No     Narrative    Cholesterol  Desirable: < 200 mg/dL  Borderline High: 200 - 239 mg/dL  High: >= 240 mg/dL    Triglycerides  Normal: < 150 mg/dL  Borderline High: 150 - 199 mg/dL  High: 200-499 mg/dL  Very High: >= 500 mg/dL    Direct Measure HDL  Female: >= 50 mg/dL   Male: >= 40 mg/dL    LDL Cholesterol  Desirable: < 100 mg/dL  Above Desirable: 100 - 129 mg/dL   Borderline High: 130 - 159 mg/dL   High:  160 - 189 mg/dL   Very High: >= 190 mg/dL    Non HDL Cholesterol  Desirable: < 130 mg/dL  Above Desirable: 130 - 159 mg/dL  Borderline High: 160 - 189 mg/dL  High: 190 - 219 mg/dL  Very High: >= 220 mg/dL   D dimer quantitative   Result Value Ref Range    D-Dimer Quantitative <=0.27 0.00 - 0.50 ug/mL FEU    Narrative    This D-dimer assay is intended for use in conjunction with a clinical pretest probability assessment model to exclude pulmonary embolism (PE) and deep venous thrombosis (DVT) in outpatients suspected of PE or DVT. The cut-off value is 0.50 ug/mL FEU.    For patients 50 years of age or older, the application of age-adjusted cut-off values for D-Dimer may increase the specificity without significant effect on sensitivity. The literature suggested calculation age adjusted cut-off in ug/L = age in years x 10 ug/L. The results in this laboratory are reported as ug/mL rather than ug/L. The calculation for age adjusted cut off in ug/mL= age in years x 0.01 ug/mL. For example, the cut off for a 76 year old male is 76 x 0.01 ug/mL = 0.76 ug/mL (760 ug/L).    M Alphonse et al. Age adjusted D-dimer cut-off levels to rule out pulmonary embolism: The ADJUST-PE Study. ANANYA  2014;311:4201-6170.; HJ Felisa et al. Diagnostic accuracy of conventional or age adjusted D-dimer cutoff values in older patients with suspected venous thromboembolism. Systemic review and meta-analysis. BMJ 2013:346:f2492.   CBC with platelets   Result Value Ref Range    WBC Count 6.0 4.0 - 11.0 10e3/uL    RBC Count 5.20 3.80 - 5.20 10e6/uL    Hemoglobin 15.4 11.7 - 15.7 g/dL    Hematocrit 45.8 35.0 - 47.0 %    MCV 88 78 - 100 fL    MCH 29.6 26.5 - 33.0 pg    MCHC 33.6 31.5 - 36.5 g/dL    RDW 12.7 10.0 - 15.0 %    Platelet Count 299 150 - 450 10e3/uL   Hemoglobin A1c   Result Value Ref Range    Estimated Average Glucose 126 (H) <117 mg/dL    Hemoglobin A1C 6.0 (H) <5.7 %      Comment:      Normal <5.7%   Prediabetes 5.7-6.4%    Diabetes 6.5% or higher     Note: Adopted from ADA consensus guidelines.   TSH Reflex GH   Result Value Ref Range    TSH 1.30 0.30 - 4.20 uIU/mL   N terminal pro BNP outpatient   Result Value Ref Range    N Terminal Pro BNP Outpatient 234 0 - 900 pg/mL      Comment:      Reference range shown and results flagged as abnormal are for the outpatient, non acute settings. Establishing a baseline value for each individual patient is useful for follow-up.    Suggested inpatient cut points for confirming diagnosis of CHF in an acute setting are:  >450 pg/mL (age 18 to less than 50)  >900 pg/mL (age 50 to less than 75)  >1800 pg/mL (75 yrs and older)    An inpatient or emergency department NT-proPBNP <300 pg/mL effectively rules out acute CHF, with 99% negative predictive value.       Magnesium   Result Value Ref Range    Magnesium 2.2 1.7 - 2.3 mg/dL       If you have any questions or concerns, please call the clinic at the number listed above.       Sincerely,      Familia Sandra, DO

## 2024-11-06 LAB
ATRIAL RATE - MUSE: 78 BPM
DIASTOLIC BLOOD PRESSURE - MUSE: NORMAL MMHG
INTERPRETATION ECG - MUSE: NORMAL
P AXIS - MUSE: 77 DEGREES
PR INTERVAL - MUSE: 162 MS
QRS DURATION - MUSE: 72 MS
QT - MUSE: 380 MS
QTC - MUSE: 433 MS
R AXIS - MUSE: -23 DEGREES
SYSTOLIC BLOOD PRESSURE - MUSE: NORMAL MMHG
T AXIS - MUSE: 66 DEGREES
VENTRICULAR RATE- MUSE: 78 BPM

## 2025-02-07 ENCOUNTER — HOSPITAL ENCOUNTER (OUTPATIENT)
Dept: ULTRASOUND IMAGING | Facility: OTHER | Age: 65
Discharge: HOME | End: 2025-02-07
Attending: INTERNAL MEDICINE
Payer: COMMERCIAL

## 2025-02-07 ENCOUNTER — HOSPITAL ENCOUNTER (OUTPATIENT)
Dept: CARDIOLOGY | Facility: OTHER | Age: 65
Discharge: HOME | End: 2025-02-07
Attending: INTERNAL MEDICINE
Payer: COMMERCIAL

## 2025-02-07 DIAGNOSIS — I51.7 RIGHT ATRIAL ENLARGEMENT: ICD-10-CM

## 2025-02-07 DIAGNOSIS — R06.09 DOE (DYSPNEA ON EXERTION): ICD-10-CM

## 2025-02-07 DIAGNOSIS — R94.31 ABNORMAL ELECTROCARDIOGRAM: ICD-10-CM

## 2025-02-07 DIAGNOSIS — I65.21 CAROTID ARTERY STENOSIS, ASYMPTOMATIC, RIGHT: ICD-10-CM

## 2025-02-07 LAB — LVEF ECHO: NORMAL

## 2025-02-07 PROCEDURE — 93306 TTE W/DOPPLER COMPLETE: CPT | Mod: 26 | Performed by: INTERNAL MEDICINE

## 2025-02-07 PROCEDURE — 93880 EXTRACRANIAL BILAT STUDY: CPT

## 2025-02-07 PROCEDURE — 93306 TTE W/DOPPLER COMPLETE: CPT

## 2025-03-03 ENCOUNTER — OFFICE VISIT (OUTPATIENT)
Dept: FAMILY MEDICINE | Facility: OTHER | Age: 65
End: 2025-03-03
Attending: STUDENT IN AN ORGANIZED HEALTH CARE EDUCATION/TRAINING PROGRAM
Payer: COMMERCIAL

## 2025-03-03 ENCOUNTER — HOSPITAL ENCOUNTER (OUTPATIENT)
Dept: GENERAL RADIOLOGY | Facility: OTHER | Age: 65
Discharge: HOME | End: 2025-03-03
Attending: STUDENT IN AN ORGANIZED HEALTH CARE EDUCATION/TRAINING PROGRAM
Payer: COMMERCIAL

## 2025-03-03 VITALS
BODY MASS INDEX: 22.26 KG/M2 | RESPIRATION RATE: 24 BRPM | WEIGHT: 121 LBS | TEMPERATURE: 100.7 F | HEIGHT: 62 IN | HEART RATE: 96 BPM | DIASTOLIC BLOOD PRESSURE: 78 MMHG | SYSTOLIC BLOOD PRESSURE: 138 MMHG | OXYGEN SATURATION: 95 %

## 2025-03-03 DIAGNOSIS — R39.9 URINARY TRACT INFECTION SYMPTOMS: ICD-10-CM

## 2025-03-03 DIAGNOSIS — J18.9 PNEUMONIA OF LEFT LOWER LOBE DUE TO INFECTIOUS ORGANISM: Primary | ICD-10-CM

## 2025-03-03 DIAGNOSIS — J06.9 UPPER RESPIRATORY TRACT INFECTION, UNSPECIFIED TYPE: ICD-10-CM

## 2025-03-03 LAB
ALBUMIN UR-MCNC: 20 MG/DL
APPEARANCE UR: ABNORMAL
BACTERIA #/AREA URNS HPF: ABNORMAL /HPF
BILIRUB UR QL STRIP: NEGATIVE
COLOR UR AUTO: YELLOW
FLUAV RNA SPEC QL NAA+PROBE: NEGATIVE
FLUBV RNA RESP QL NAA+PROBE: NEGATIVE
GLUCOSE UR STRIP-MCNC: NEGATIVE MG/DL
HGB UR QL STRIP: NEGATIVE
KETONES UR STRIP-MCNC: NEGATIVE MG/DL
LEUKOCYTE ESTERASE UR QL STRIP: ABNORMAL
MUCOUS THREADS #/AREA URNS LPF: PRESENT /LPF
NITRATE UR QL: POSITIVE
PH UR STRIP: 5 [PH] (ref 5–9)
RBC URINE: 3 /HPF
RSV RNA SPEC NAA+PROBE: NEGATIVE
S PYO DNA THROAT QL NAA+PROBE: NOT DETECTED
SARS-COV-2 RNA RESP QL NAA+PROBE: NEGATIVE
SP GR UR STRIP: 1.03 (ref 1–1.03)
SQUAMOUS EPITHELIAL: 2 /HPF
UROBILINOGEN UR STRIP-MCNC: NORMAL MG/DL
WBC URINE: 20 /HPF

## 2025-03-03 PROCEDURE — 1125F AMNT PAIN NOTED PAIN PRSNT: CPT | Performed by: STUDENT IN AN ORGANIZED HEALTH CARE EDUCATION/TRAINING PROGRAM

## 2025-03-03 PROCEDURE — 99214 OFFICE O/P EST MOD 30 MIN: CPT | Performed by: STUDENT IN AN ORGANIZED HEALTH CARE EDUCATION/TRAINING PROGRAM

## 2025-03-03 PROCEDURE — G0463 HOSPITAL OUTPT CLINIC VISIT: HCPCS | Mod: 25

## 2025-03-03 PROCEDURE — 87651 STREP A DNA AMP PROBE: CPT | Mod: ZL | Performed by: STUDENT IN AN ORGANIZED HEALTH CARE EDUCATION/TRAINING PROGRAM

## 2025-03-03 PROCEDURE — 81003 URINALYSIS AUTO W/O SCOPE: CPT | Mod: ZL | Performed by: STUDENT IN AN ORGANIZED HEALTH CARE EDUCATION/TRAINING PROGRAM

## 2025-03-03 PROCEDURE — 3078F DIAST BP <80 MM HG: CPT | Performed by: STUDENT IN AN ORGANIZED HEALTH CARE EDUCATION/TRAINING PROGRAM

## 2025-03-03 PROCEDURE — 87637 SARSCOV2&INF A&B&RSV AMP PRB: CPT | Mod: ZL | Performed by: STUDENT IN AN ORGANIZED HEALTH CARE EDUCATION/TRAINING PROGRAM

## 2025-03-03 PROCEDURE — 87186 SC STD MICRODIL/AGAR DIL: CPT | Mod: ZL | Performed by: STUDENT IN AN ORGANIZED HEALTH CARE EDUCATION/TRAINING PROGRAM

## 2025-03-03 PROCEDURE — 71046 X-RAY EXAM CHEST 2 VIEWS: CPT

## 2025-03-03 PROCEDURE — 3075F SYST BP GE 130 - 139MM HG: CPT | Performed by: STUDENT IN AN ORGANIZED HEALTH CARE EDUCATION/TRAINING PROGRAM

## 2025-03-03 RX ORDER — DOXYCYCLINE 100 MG/1
100 CAPSULE ORAL 2 TIMES DAILY
Qty: 14 CAPSULE | Refills: 0 | Status: SHIPPED | OUTPATIENT
Start: 2025-03-03 | End: 2025-03-10

## 2025-03-03 RX ORDER — CEFDINIR 300 MG/1
300 CAPSULE ORAL 2 TIMES DAILY
Qty: 14 CAPSULE | Refills: 0 | Status: SHIPPED | OUTPATIENT
Start: 2025-03-03 | End: 2025-03-10

## 2025-03-03 ASSESSMENT — PAIN SCALES - GENERAL: PAINLEVEL_OUTOF10: SEVERE PAIN (10)

## 2025-03-03 NOTE — PATIENT INSTRUCTIONS
Pneumonia    Omnicef as well as doxycycline.  Take with food.  Probiotics.    Continue over-the-counter management including Tylenol.    Cough drops.    Deep breathing exercises and activity as tolerated.    Recommend you follow-up with your primary care provider in 4 to 6 weeks for reevaluation.    Return to rapid clinic or ER if symptoms worsen or change in the meantime.

## 2025-03-03 NOTE — PROGRESS NOTES
Assessment & Plan     (J18.9) Pneumonia of left lower lobe due to infectious organism  (primary encounter diagnosis)    Comment: Evidence of pneumonia.  Possible bladder infection.  Elevated temperature to 100.7  F.  Pulse of 96, oxygen 95%, respiration rate 24.  She overall appears nontoxic.  Tolerating oral intake.  No difficulty with breathing at this time.  COVID, influenza, RSV, and strep are all negative.  She had a positive strep x 1 month ago, treated with cefdinir.    Plan: doxycycline hyclate (VIBRAMYCIN) 100 MG         capsule, cefdinir (OMNICEF) 300 MG capsule          Plan to treat with both doxycycline as well as cefdinir.  Continue over-the-counter management as needed.  Recommended close return precautions if symptoms were to worsen or change.  Follow-up with PCP for any persisting symptoms.  She is comfortable and agreeable with this plan.      (J06.9) Upper respiratory tract infection, unspecified type  Comment: Pneumonia.  Plan: XR Chest 2 Views, Influenza A/B, RSV and         SARS-CoV2 PCR (COVID-19) Nose, Group A         Streptococcus PCR Throat Swab            (R39.9) Urinary tract infection symptoms  Comment: Urine with nitrates, leukocyte esterase, 20 white blood cell. Culture is pending.  Plan: UA with Microscopic reflex to Culture, Urine         Culture        Treating pneumonia with cefdinir as well as doxycycline.  This should treat any bladder infection.  Close return precautions if symptoms were to worsen or change.      Diomedes Mccarty is a 64 year old, presenting for the following health issues:  Dizziness (X 4 days - legs hurting), Cough (X 2 weeks), and Headache (X 4 days)    HPI      Patient presents today with concerns of dizziness, headaches, sore throat, cough, congestion.  She notes she has had leg cramps as well.  That has been for the last 4 to 5 days.  She has tried her ibuprofen, migraine meds with minimal relief of symptoms.  She has had some fevers.  Chills and  "sweats.  She does note that she does have a 9-year-old grandson who spends time with her.  She was treated last month for both strep throat as well as influenza.      Review of Systems  Constitutional, HEENT, cardiovascular, pulmonary, gi and gu systems are negative, except as otherwise noted.        Objective    /78 (BP Location: Left arm, Patient Position: Sitting, Cuff Size: Adult Regular)   Pulse 96   Temp 100.7  F (38.2  C) (Tympanic)   Resp 24   Ht 1.575 m (5' 2\")   Wt 54.9 kg (121 lb)   LMP 05/12/2003 (Approximate)   SpO2 95%   BMI 22.13 kg/m    Body mass index is 22.13 kg/m .    Physical Exam   GENERAL: alert and no distress  EYES: Eyes grossly normal to inspection, PERRL and conjunctivae and sclerae normal  HENT: Left ear canal with cerumen, right ear canal clear, right TM bulging, erythematous, pharynx with slight erythema, scant exudate, tonsils 1+ bilaterally, uvula midline, symmetric  NECK: no adenopathy, no asymmetry, masses, or scars  RESP: lungs clear to auscultation - no rales, rhonchi or wheezes  CV: regular rate and rhythm, normal S1 S2, no S3 or S4, no murmur, click or rub, no peripheral edema  MS: no gross musculoskeletal defects noted, no edema    Results for orders placed or performed during the hospital encounter of 03/03/25   XR Chest 2 Views     Status: None    Narrative    PROCEDURE:  XR CHEST 2 VIEWS    HISTORY: cough, congestion; Upper respiratory tract infection,  unspecified type    COMPARISON: Chest radiographs 11/5/2024    FINDINGS: PA and lateral chest radiographs    Cardiomediastinal silhouette is within normal limits. There is  calcific aortic atherosclerosis.  Ill-defined left basilar opacities. No effusion or pneumothorax.    No suspicious osseous lesion or subdiaphragmatic free air.      Impression    IMPRESSION:    Ill-defined left basilar opacities, likely representing infection.    PATRICK ALVES MD         SYSTEM ID:  [id number]   Results for orders placed or " performed in visit on 03/03/25   Influenza A/B, RSV and SARS-CoV2 PCR (COVID-19) Nose     Status: Normal    Specimen: Nose; Swab   Result Value Ref Range    Influenza A PCR Negative Negative    Influenza B PCR Negative Negative    RSV PCR Negative Negative    SARS CoV2 PCR Negative Negative    Narrative    Testing was performed using the Xpert Xpress CoV2/Flu/RSV Assay on the [employer] GeneXpert Instrument. This test should be ordered for the detection of SARS-CoV2, influenza, and RSV viruses in individuals with signs and symptoms of respiratory tract infection. This test is for in vitro diagnostic use under the [place] FDA for laboratories certified under CLIA to perform high or moderate complexity testing. This test has been [place] FDA cleared. A negative result does not rule out the presence of PCR inhibitors in the specimen or target RNA in concentration below the limit of detection for the assay. If only one viral target is positive but coinfection with multiple targets is suspected, the sample should be re-tested with another FDA cleared, approved, or authorized test, if coninfection would change clinical management. This test was validated by the [facility]. These laboratories are certified under the Clinical Laboratory Improvement Amendments of 1988 (CLIA-88) as qualified to perfom high complexity laboratory testing.   UA with Microscopic reflex to Culture     Status: Abnormal    Specimen: Urine, Midstream   Result Value Ref Range    Color Urine Yellow Colorless, Straw, Light Yellow, Yellow    Appearance Urine Slightly Cloudy (A) Clear    Glucose Urine Negative Negative mg/dL    Bilirubin Urine Negative Negative    Ketones Urine Negative Negative mg/dL    Specific Gravity Urine 1.026 1.000 - 1.030    Blood Urine Negative Negative    pH Urine 5.0 5.0 - 9.0    Protein Albumin Urine 20 (A) Negative mg/dL    Urobilinogen Urine Normal Normal, 2.0 mg/dL    Nitrite Urine Positive (A) Negative    Leukocyte  Esterase Urine Moderate (A) Negative    Bacteria Urine Many (A) None Seen /HPF    Mucus Urine Present (A) None Seen /LPF    RBC Urine 3 (H) <=2 /HPF    WBC Urine 20 (H) <=5 /HPF    Squamous Epithelials Urine 2 (H) <=1 /HPF    Narrative    Urine Culture ordered based on laboratory criteria   Group A Streptococcus PCR Throat Swab     Status: Normal    Specimen: Throat; Swab   Result Value Ref Range    Group A strep by PCR Not Detected Not Detected    Narrative    The Xpert Xpress Strep A test, performed on the [employer], is a rapid, qualitative in vitro diagnostic test for the detection of Streptococcus pyogenes (Group A ß-hemolytic Streptococcus, Strep A) in throat swab specimens from patients with signs and symptoms of pharyngitis. The Xpert Xpress Strep A test can be used as an aid in the diagnosis of Group A Streptococcal pharyngitis. The assay is not intended to monitor treatment for Group A Streptococcus infections. The Xpert Xpress Strep A test utilizes an automated real-time polymerase chain reaction (PCR) to detect Streptococcus pyogenes DNA.         Signed Electronically by: Arlene Moon PA-C

## 2025-03-03 NOTE — NURSING NOTE
"Chief Complaint   Patient presents with    Dizziness     X 4 days - legs hurting    Cough     X 2 weeks    Headache     X 4 days     Patient tx with rx of headache meds and ibuprofen with little relief.  Inf A dx one month ago - cough persists     Initial /78 (BP Location: Left arm, Patient Position: Sitting, Cuff Size: Adult Regular)   Pulse 96   Temp 100.7  F (38.2  C) (Tympanic)   Resp 24   Ht 1.575 m (5' 2\")   Wt 54.9 kg (121 lb)   LMP 05/12/2003 (Approximate)   SpO2 95%   BMI 22.13 kg/m   Estimated body mass index is 22.13 kg/m  as calculated from the following:    Height as of this encounter: 1.575 m (5' 2\").    Weight as of this encounter: 54.9 kg (121 lb).     Advance Care Directive on file? N    FOOD SECURITY SCREENING QUESTIONS:    The next two questions are to help us understand your food security.  If you are feeling you need any assistance in this area, we have resources available to support you today.    Hunger Vital Signs:  Within the past 12 months we worried whether our food would run out before we got money to buy more. Never  Within the past 12 months the food we bought just didn't last and we didn't have money to get more. Never  Mirna Chung LPN,[facility] on 3/3/2025 at 10:02 AM      Mirna Chung LPN     "

## 2025-03-05 LAB — BACTERIA UR CULT: ABNORMAL

## 2025-03-05 NOTE — RESULT ENCOUNTER NOTE
Please let patient know that after reviewing her culture sensitive report the antibiotics that she is on currently for pneumonia will cover her urinary tract infection as well, please continue as prescribed.

## 2025-05-15 ENCOUNTER — TRANSFERRED RECORDS (OUTPATIENT)
Dept: HEALTH INFORMATION MANAGEMENT | Facility: CLINIC | Age: 65
End: 2025-05-15

## 2025-05-15 LAB — COLOGUARD-ABSTRACT: POSITIVE

## 2025-05-28 ENCOUNTER — MEDICAL CORRESPONDENCE (OUTPATIENT)
Dept: HEALTH INFORMATION MANAGEMENT | Facility: OTHER | Age: 65
End: 2025-05-28
Payer: COMMERCIAL

## 2025-05-29 ENCOUNTER — TRANSFERRED RECORDS (OUTPATIENT)
Dept: HEALTH INFORMATION MANAGEMENT | Facility: CLINIC | Age: 65
End: 2025-05-29
Payer: COMMERCIAL

## 2025-06-11 ENCOUNTER — MEDICAL CORRESPONDENCE (OUTPATIENT)
Dept: HEALTH INFORMATION MANAGEMENT | Facility: CLINIC | Age: 65
End: 2025-06-11
Payer: COMMERCIAL

## 2025-06-13 ENCOUNTER — HOSPITAL ENCOUNTER (OUTPATIENT)
Facility: OTHER | Age: 65
End: 2025-06-13
Attending: SURGERY | Admitting: SURGERY
Payer: COMMERCIAL

## 2025-06-13 DIAGNOSIS — Z12.11 ENCOUNTER FOR SCREENING COLONOSCOPY: Primary | ICD-10-CM

## 2025-06-13 NOTE — TELEPHONE ENCOUNTER
Screening Questions for the Scheduling of Screening Colonoscopies   (If Colonoscopy is diagnostic, Provider should review the chart before scheduling.)  Are you younger than 45 or older than 80?  NO  Do you take aspirin or fish oil?  ASPIRIN (if yes, tell patient to stop 1 week prior to Colonoscopy)  Do you take warfarin (Coumadin), clopidogrel (Plavix), apixaban (Eliquis), dabigatram (Pradaxa), rivaroxaban (Xarelto) or any blood thinner? NO  Do you take semaglutide (Ozempic or Wegovy), tirzepatide (Mounjaro or Zepbound), liraglutide (Victoza), or dulaglutide (Trulicity)? NO  Do you use oxygen or a CPAP at home?  NO  Do you have kidney disease? NO  Are you on dialysis? NO  Have you had a stroke or heart attack in the last year? NO  Have you had a stent in your heart or any blood vessel in the last year? NO  Have you had a transplant of any organ? NO  Have you had a colonoscopy or upper endoscopy (EGD) before? NO  Date of scheduled Colonoscopy. 07/29/2025  Provider [facility]  Pharmacy [facility]  Order Priority ROUTINE

## 2025-06-16 RX ORDER — BISACODYL 5 MG/1
TABLET, DELAYED RELEASE ORAL
Qty: 2 TABLET | Refills: 0 | Status: SHIPPED | OUTPATIENT
Start: 2025-07-22

## 2025-06-16 RX ORDER — POLYETHYLENE GLYCOL 3350, SODIUM CHLORIDE, SODIUM BICARBONATE, POTASSIUM CHLORIDE 420; 11.2; 5.72; 1.48 G/4L; G/4L; G/4L; G/4L
4000 POWDER, FOR SOLUTION ORAL ONCE
Qty: 4000 ML | Refills: 0 | Status: SHIPPED | OUTPATIENT
Start: 2025-07-22 | End: 2025-07-22

## 2025-07-23 DIAGNOSIS — Z12.11 ENCOUNTER FOR SCREENING COLONOSCOPY: Primary | ICD-10-CM

## 2025-07-23 NOTE — TELEPHONE ENCOUNTER
Screening Questions for the Scheduling of Screening Colonoscopies   (If Colonoscopy is diagnostic, Provider should review the chart before scheduling.)    Are you younger than 45 or older than 80?  NO    Do you take aspirin or fish oil?  ASPIRIN (if yes, tell patient to stop 1 week prior to Colonoscopy)    Do you take warfarin (Coumadin), clopidogrel (Plavix), apixaban (Eliquis), dabigatram (Pradaxa),   rivaroxaban (Xarelto) or any blood thinner? NO    Do you take semaglutide (Ozempic or Wegovy), tirzepatide (Mounjaro or Zepbound), liraglutide   (Victoza), or dulaglutide (Trulicity)? NO    Do you use oxygen or a CPAP at home?  NO    Do you have kidney disease? NO    Are you on dialysis? NO    Have you had a stroke or heart attack in the last year? NO    Have you had a stent in your heart or any blood vessel in the last year? NO    Have you had a transplant of any organ? NO    Have you had a colonoscopy or upper endoscopy (EGD) before? NO    Date of scheduled Colonoscopy. 09/04/2025    Provider [facility]    Pharmacy [facility]    Order Priority ROUTINE

## 2025-07-24 RX ORDER — POLYETHYLENE GLYCOL 3350, SODIUM CHLORIDE, SODIUM BICARBONATE, POTASSIUM CHLORIDE 420; 11.2; 5.72; 1.48 G/4L; G/4L; G/4L; G/4L
4000 POWDER, FOR SOLUTION ORAL ONCE
Qty: 4000 ML | Refills: 0 | Status: SHIPPED | OUTPATIENT
Start: 2025-08-28 | End: 2025-08-28

## 2025-07-24 RX ORDER — BISACODYL 5 MG/1
TABLET, DELAYED RELEASE ORAL
Qty: 2 TABLET | Refills: 0 | Status: SHIPPED | OUTPATIENT
Start: 2025-08-28 | End: 2025-09-12

## 2025-08-20 ENCOUNTER — HOSPITAL ENCOUNTER (OUTPATIENT)
Facility: OTHER | Age: 65
End: 2025-08-20
Attending: SURGERY | Admitting: SURGERY
Payer: COMMERCIAL

## 2025-08-28 ENCOUNTER — OFFICE VISIT (OUTPATIENT)
Dept: CARDIOLOGY | Facility: OTHER | Age: 65
End: 2025-08-28
Attending: INTERNAL MEDICINE
Payer: COMMERCIAL

## 2025-08-28 VITALS
RESPIRATION RATE: 16 BRPM | HEART RATE: 66 BPM | TEMPERATURE: 97.7 F | SYSTOLIC BLOOD PRESSURE: 110 MMHG | HEIGHT: 61 IN | BODY MASS INDEX: 22.81 KG/M2 | DIASTOLIC BLOOD PRESSURE: 60 MMHG | WEIGHT: 120.8 LBS

## 2025-08-28 DIAGNOSIS — I1A.0 RESISTANT HYPERTENSION: Primary | ICD-10-CM

## 2025-08-28 DIAGNOSIS — R06.09 DOE (DYSPNEA ON EXERTION): ICD-10-CM

## 2025-08-28 DIAGNOSIS — R94.31 ABNORMAL ELECTROCARDIOGRAM: ICD-10-CM

## 2025-08-28 DIAGNOSIS — Z86.73 HISTORY OF STROKE: ICD-10-CM

## 2025-08-28 DIAGNOSIS — G47.33 OSA (OBSTRUCTIVE SLEEP APNEA): ICD-10-CM

## 2025-08-28 DIAGNOSIS — I65.21 ATHEROSCLEROSIS OF RIGHT CAROTID ARTERY: ICD-10-CM

## 2025-08-28 DIAGNOSIS — J44.9 CHRONIC OBSTRUCTIVE PULMONARY DISEASE, UNSPECIFIED COPD TYPE (H): ICD-10-CM

## 2025-08-28 DIAGNOSIS — Z87.891 HISTORY OF TOBACCO ABUSE: ICD-10-CM

## 2025-08-28 DIAGNOSIS — I10 BENIGN ESSENTIAL HYPERTENSION: ICD-10-CM

## 2025-08-28 DIAGNOSIS — I65.21 CAROTID ARTERY STENOSIS, ASYMPTOMATIC, RIGHT: ICD-10-CM

## 2025-08-28 DIAGNOSIS — E78.2 MIXED HYPERLIPIDEMIA: ICD-10-CM

## 2025-08-28 RX ORDER — CARVEDILOL 25 MG/1
25 TABLET ORAL 2 TIMES DAILY WITH MEALS
Status: SHIPPED
Start: 2025-08-28

## 2025-08-28 RX ORDER — ATORVASTATIN CALCIUM 20 MG/1
20 TABLET, FILM COATED ORAL EVERY EVENING
Qty: 90 TABLET | Refills: 3 | Status: SHIPPED | OUTPATIENT
Start: 2025-08-28

## 2025-08-28 RX ORDER — CHLORTHALIDONE 25 MG/1
25 TABLET ORAL DAILY
Status: SHIPPED
Start: 2025-08-28

## 2025-08-28 RX ORDER — LOSARTAN POTASSIUM 25 MG/1
1 TABLET ORAL DAILY
COMMUNITY
Start: 2025-08-12 | End: 2025-08-28

## 2025-08-28 RX ORDER — ESCITALOPRAM OXALATE 20 MG/1
20 TABLET ORAL DAILY
COMMUNITY
Start: 2025-08-25

## 2025-08-28 RX ORDER — ASPIRIN 81 MG/1
81 TABLET, COATED ORAL DAILY
Qty: 90 TABLET | Refills: 3 | Status: SHIPPED | OUTPATIENT
Start: 2025-08-28

## 2025-08-28 RX ORDER — LOSARTAN POTASSIUM 25 MG/1
25 TABLET ORAL AT BEDTIME
Qty: 90 TABLET | Refills: 3 | Status: SHIPPED | OUTPATIENT
Start: 2025-08-28

## 2025-08-28 ASSESSMENT — PAIN SCALES - GENERAL: PAINLEVEL_OUTOF10: NO PAIN (0)

## 2025-09-02 DIAGNOSIS — J44.9 CHRONIC OBSTRUCTIVE PULMONARY DISEASE, UNSPECIFIED COPD TYPE (H): Primary | ICD-10-CM

## 2025-09-04 ENCOUNTER — HOSPITAL ENCOUNTER (OUTPATIENT)
Dept: RESPIRATORY THERAPY | Facility: OTHER | Age: 65
End: 2025-09-04
Attending: INTERNAL MEDICINE
Payer: COMMERCIAL

## 2025-09-04 DIAGNOSIS — R06.09 DOE (DYSPNEA ON EXERTION): ICD-10-CM

## 2025-09-04 DIAGNOSIS — J44.9 CHRONIC OBSTRUCTIVE PULMONARY DISEASE, UNSPECIFIED COPD TYPE (H): ICD-10-CM

## 2025-09-04 LAB
DLCOCOR-%PRED-PRE: 64 %
DLCOCOR-PRE: 11.33 ML/MIN/MMHG
DLCOUNC-%PRED-PRE: 57 %
DLCOUNC-PRE: 10.01 ML/MIN/MMHG
DLCOUNC-PRED: 17.51 ML/MIN/MMHG
ERV-%PRED-PRE: 219 %
ERV-PRE: 1.44 L
ERV-PRED: 0.65 L
EXPTIME-PRE: 11.23 SEC
FEF2575-%PRED-POST: 96 %
FEF2575-%PRED-PRE: 66 %
FEF2575-POST: 1.86 L/SEC
FEF2575-PRE: 1.27 L/SEC
FEF2575-PRED: 1.92 L/SEC
FEFMAX-%PRED-PRE: 117 %
FEFMAX-PRE: 6.4 L/SEC
FEFMAX-PRED: 5.47 L/SEC
FEV1-%PRED-PRE: 135 %
FEV1-PRE: 2.65 L
FEV1FEV6-PRE: 70 %
FEV1FEV6-PRED: 80 %
FEV1FVC-PRE: 65 %
FEV1FVC-PRED: 80 %
FEV1SVC-PRE: 64 L
FEV1SVC-PRED: 69 L
FIFMAX-PRE: 2.46 L/SEC
FRCPLETH-%PRED-PRE: 155 %
FRCPLETH-PRE: 3.61 L
FRCPLETH-PRED: 2.31 L
FVC-%PRED-PRE: 167 %
FVC-PRE: 4.09 L
FVC-PRED: 2.44 L
GAW-PRED: 1.03 L/S/CMH2O
IC-%PRED-PRE: 115 %
IC-PRE: 2.41 L
IC-PRED: 2.09 L
Lab: 80 %
RVPLETH-%PRED-PRE: 117 %
RVPLETH-PRE: 1.86 L
RVPLETH-PRED: 1.57 L
SGAW-PRED: 0.2 1/CMH2O*S
SRAW-PRED: < 4.76 CMH2O*S
TLCPLETH-%PRED-PRE: 137 %
TLCPLETH-PRE: 6.02 L
TLCPLETH-PRED: 4.38 L
VA-%PRED-PRE: 124 %
VA-PRE: 5 L
VC-%PRED-PRE: 146 %
VC-PRE: 4.16 L
VC-PRED: 2.84 L

## 2025-09-04 PROCEDURE — 999N000157 HC STATISTIC RCP TIME EA 10 MIN

## 2025-09-04 PROCEDURE — 250N000009 HC RX 250: Performed by: INTERNAL MEDICINE

## 2025-09-04 PROCEDURE — 94640 AIRWAY INHALATION TREATMENT: CPT

## 2025-09-04 PROCEDURE — 94060 EVALUATION OF WHEEZING: CPT | Mod: 26

## 2025-09-04 RX ORDER — ALBUTEROL SULFATE 0.83 MG/ML
2.5 SOLUTION RESPIRATORY (INHALATION)
Status: COMPLETED | OUTPATIENT
Start: 2025-09-04 | End: 2025-09-04

## 2025-09-04 RX ADMIN — ALBUTEROL SULFATE 2.5 MG: 2.5 SOLUTION RESPIRATORY (INHALATION) at 14:53

## (undated) RX ORDER — KETOROLAC TROMETHAMINE 30 MG/ML
INJECTION, SOLUTION INTRAMUSCULAR; INTRAVENOUS
Status: DISPENSED
Start: 2024-03-20